# Patient Record
Sex: FEMALE | Race: WHITE | NOT HISPANIC OR LATINO | Employment: FULL TIME | URBAN - METROPOLITAN AREA
[De-identification: names, ages, dates, MRNs, and addresses within clinical notes are randomized per-mention and may not be internally consistent; named-entity substitution may affect disease eponyms.]

---

## 2017-10-11 ENCOUNTER — TRANSFERRED RECORDS (OUTPATIENT)
Dept: HEALTH INFORMATION MANAGEMENT | Facility: CLINIC | Age: 32
End: 2017-10-11

## 2017-10-11 LAB
HPV ABSTRACT: NORMAL
PAP-ABSTRACT: NORMAL

## 2019-01-04 ENCOUNTER — OFFICE VISIT (OUTPATIENT)
Dept: FAMILY MEDICINE | Facility: CLINIC | Age: 34
End: 2019-01-04
Payer: COMMERCIAL

## 2019-01-04 VITALS
OXYGEN SATURATION: 99 % | HEART RATE: 69 BPM | DIASTOLIC BLOOD PRESSURE: 80 MMHG | TEMPERATURE: 98.1 F | HEIGHT: 65 IN | RESPIRATION RATE: 16 BRPM | WEIGHT: 171 LBS | BODY MASS INDEX: 28.49 KG/M2 | SYSTOLIC BLOOD PRESSURE: 110 MMHG

## 2019-01-04 DIAGNOSIS — E66.3 OVERWEIGHT (BMI 25.0-29.9): ICD-10-CM

## 2019-01-04 DIAGNOSIS — Z00.00 ROUTINE GENERAL MEDICAL EXAMINATION AT A HEALTH CARE FACILITY: Primary | ICD-10-CM

## 2019-01-04 DIAGNOSIS — G43.809 OTHER MIGRAINE WITHOUT STATUS MIGRAINOSUS, NOT INTRACTABLE: ICD-10-CM

## 2019-01-04 PROCEDURE — 99395 PREV VISIT EST AGE 18-39: CPT | Performed by: FAMILY MEDICINE

## 2019-01-04 ASSESSMENT — MIFFLIN-ST. JEOR: SCORE: 1491.53

## 2019-01-04 NOTE — PROGRESS NOTES
SUBJECTIVE:   CC: Anne-Marie Hooks is an 33 year old woman who presents for preventive health visit.     Physical   Annual:     Getting at least 3 servings of Calcium per day:  Yes    Bi-annual eye exam:  Yes    Dental care twice a year:  Yes    Sleep apnea or symptoms of sleep apnea:  None    Diet:  Other    Frequency of exercise:  4-5 days/week    Duration of exercise:  45-60 minutes    Taking medications regularly:  Yes    Medication side effects:  Not applicable and None    Additional concerns today:  YES    PHQ-2 Total Score: 0    Derm Problem      Duration: x 2-3 months    Description (location/character/radiation): Mole on Back    Intensity:  mild    Accompanying signs and symptoms: None    History (similar episodes/previous evaluation): Family History of Melanoma    Precipitating or alleviating factors: None    Therapies tried and outcome: None     Today's PHQ-2 Score:   PHQ-2 ( 1999 [employer]) 12/31/2018   Q1: Little interest or pleasure in doing things 0   Q2: Feeling down, depressed or hopeless 0   PHQ-2 Score 0   Q1: Little interest or pleasure in doing things Not at all   Q2: Feeling down, depressed or hopeless Not at all   PHQ-2 Score 0       Abuse: Current or Past(Physical, Sexual or Emotional)- No  Do you feel safe in your environment? Yes    Social History     Tobacco Use     Smoking status: Never Smoker     Smokeless tobacco: Never Used   Substance Use Topics     Alcohol use: Yes     Alcohol/week: 0.0 oz     Comment: about 5 drinks per week     Alcohol Use 12/31/2018   If you drink alcohol do you typically have greater than 3 drinks per day OR greater than 7 drinks per week? No   No flowsheet data found.    Reviewed orders with patient.  Reviewed health maintenance and updated orders accordingly - Yes  Labs reviewed in EPIC    Mammogram not appropriate for this patient based on age.    Pertinent mammograms are reviewed under the imaging tab.  History of abnormal Pap smear: NO - age 30- 65 PAP every  "3 years recommended  PAP / HPV 12/4/2015   PAP NIL     Reviewed and updated as needed this visit by clinical staff  Tobacco  Allergies  Meds  Problems  Med Hx  Surg Hx  Fam Hx  Soc Hx          Reviewed and updated as needed this visit by Provider            Review of Systems  CONSTITUTIONAL: NEGATIVE for fever, chills, change in weight  INTEGUMENTARU/SKIN: NEGATIVE for worrisome rashes, moles or lesions  EYES: NEGATIVE for vision changes or irritation  ENT: NEGATIVE for ear, mouth and throat problems  RESP: NEGATIVE for significant cough or SOB  BREAST: NEGATIVE for masses, tenderness or discharge  CV: NEGATIVE for chest pain, palpitations or peripheral edema  GI: NEGATIVE for nausea, abdominal pain, heartburn, or change in bowel habits  : NEGATIVE for unusual urinary or vaginal symptoms. Periods are regular.  MUSCULOSKELETAL: NEGATIVE for significant arthralgias or myalgia  NEURO: NEGATIVE for weakness, dizziness or paresthesias  PSYCHIATRIC: NEGATIVE for changes in mood or affect     OBJECTIVE:   /80   Pulse 69   Temp 98.1  F (36.7  C) (Tympanic)   Resp 16   Ht 1.651 m (5' 5\")   Wt 77.6 kg (171 lb)   LMP  (LMP Unknown)   SpO2 99%   Breastfeeding? No   BMI 28.46 kg/m    Physical Exam  GENERAL: healthy, alert, no distress and over weight  EYES: Eyes grossly normal to inspection, PERRL and conjunctivae and sclerae normal  NECK: no adenopathy, no asymmetry, masses, or scars and thyroid normal to palpation  RESP: lungs clear to auscultation - no rales, rhonchi or wheezes  BREAST: normal without masses, tenderness or nipple discharge and no palpable axillary masses or adenopathy  CV: regular rate and rhythm, normal S1 S2, no S3 or S4, no murmur, click or rub, no peripheral edema and peripheral pulses strong  ABDOMEN: soft, nontender, no hepatosplenomegaly, no masses and bowel sounds normal  MS: no gross musculoskeletal defects noted, no edema  SKIN: no suspicious lesions or rashes  NEURO: Normal " "strength and tone, mentation intact and speech normal  PSYCH: mentation appears normal, affect normal/bright  LYMPH: no cervical, supraclavicular, axillary, or inguinal adenopathy    Diagnostic Test Results:  none     ASSESSMENT/PLAN:       ICD-10-CM    1. Routine general medical examination at a health care facility Z00.00    2. Other migraine without status migrainosus, not intractable G43.809    3. Overweight (BMI 25.0-29.9) E66.3        COUNSELING:  Reviewed preventive health counseling, as reflected in patient instructions       Regular exercise       Healthy diet/nutrition    BP Readings from Last 1 Encounters:   01/04/19 110/80     Estimated body mass index is 28.46 kg/m  as calculated from the following:    Height as of this encounter: 1.651 m (5' 5\").    Weight as of this encounter: 77.6 kg (171 lb).      Weight management plan: Discussed healthy diet and exercise guidelines     reports that  has never smoked. she has never used smokeless tobacco.     Patient Instructions     Preventive Health Recommendations  Female Ages 26 - 39  Yearly exam:   See your health care provider every year in order to    Review health changes.     Discuss preventive care.      Review your medicines if you your doctor has prescribed any.    Until age 30: Get a Pap test every three years (more often if you have had an abnormal result).    After age 30: Talk to your doctor about whether you should have a Pap test every 3 years or have a Pap test with HPV screening every 5 years.   You do not need a Pap test if your uterus was removed (hysterectomy) and you have not had cancer.  You should be tested each year for STDs (sexually transmitted diseases), if you're at risk.   Talk to your provider about how often to have your cholesterol checked.  If you are at risk for diabetes, you should have a diabetes test (fasting glucose).  Shots: Get a flu shot each year. Get a tetanus shot every 10 years.   Nutrition:     Eat at least 5 " servings of fruits and vegetables each day.    Eat whole-grain bread, whole-wheat pasta and brown rice instead of white grains and rice.    Get adequate Calcium and Vitamin D.     Lifestyle    Exercise at least 150 minutes a week (30 minutes a day, 5 days of the week). This will help you control your weight and prevent disease.    Limit alcohol to one drink per day.    No smoking.     Wear sunscreen to prevent skin cancer.    See your dentist every six months for an exam and cleaning.      1. Please do your breast exam every mo, when you  Change the  calendar page or set an alarm on your cell phone Do a  visual check for dimples, inversion or indentation or any different position of the nipple Feel manually  for any 1cm or larger  size mass ie about the size of an almond Be sure to cover the entire area of both breasts : this extends back to the back on either side and from the collar bone to the bottom of the breasts where you can begin to feel ribs.  Men are advised to do this exam also as they now have a higher rate of breast cancer , like women do .     2.  Weight Loss Tips  1. Do not eat after 6 hrs before your expected bedtime  2. Have your heaviest meal for breakfast, a slightly lighter meal at lunch and a snack 6 hrs before bed  3. No sugar/calorie drinks except milk ie no fruit juice, pop, alcohol.  4. Drink milk 30min before meals to decrease your hunger. Also it is excellent as part of your last meal of the day snack  5. Drink lots of water  6. Increase fiber in diet: all bran cereal, salads, popcorn etc  7. Have only one small serving of fruit a day about 1/2 cup (as this is high in sugar)  8. EXERCISE is the bottom line. Without it, you will gain weight even on a low calorie diet. Best if done 2-3X a day as can    Being overweight contributes to high blood pressure and high cholesterol, both of which cause heart attacks, strokes and kidney failure, prediabetes and diabetes, arthritis, and liver disease              Counseling Resources:  ATP IV Guidelines  Pooled Cohorts Equation Calculator  Breast Cancer Risk Calculator  FRAX Risk Assessment  ICSI Preventive Guidelines  Dietary Guidelines for Americans, 2010  USDA's MyPlate  ASA Prophylaxis  Lung CA Screening    Savi Avelar MD  [facility]

## 2019-01-04 NOTE — NURSING NOTE
"Chief Complaint   Patient presents with     Physical     /80   Pulse 69   Temp 98.1  F (36.7  C) (Tympanic)   Resp 16   Ht 1.651 m (5' 5\")   Wt 77.6 kg (171 lb)   LMP  (LMP Unknown)   SpO2 99%   Breastfeeding? No   BMI 28.46 kg/m   Estimated body mass index is 28.46 kg/m  as calculated from the following:    Height as of this encounter: 1.651 m (5' 5\").    Weight as of this encounter: 77.6 kg (171 lb).  BP completed using cuff size: regular   Honey Quiroga CMA    Health Maintenance Due   Topic Date Due     HIV SCREEN (SYSTEM ASSIGNED)  07/28/2005     INFLUENZA VACCINE (1) 09/01/2018     Health Maintenance reviewed at today's visit patient asked to schedule/complete:   Immunizations:  Patient agrees to schedule    "

## 2019-01-04 NOTE — PATIENT INSTRUCTIONS
Preventive Health Recommendations  Female Ages 26 - 39  Yearly exam:   See your health care provider every year in order to    Review health changes.     Discuss preventive care.      Review your medicines if you your doctor has prescribed any.    Until age 30: Get a Pap test every three years (more often if you have had an abnormal result).    After age 30: Talk to your doctor about whether you should have a Pap test every 3 years or have a Pap test with HPV screening every 5 years.   You do not need a Pap test if your uterus was removed (hysterectomy) and you have not had cancer.  You should be tested each year for STDs (sexually transmitted diseases), if you're at risk.   Talk to your provider about how often to have your cholesterol checked.  If you are at risk for diabetes, you should have a diabetes test (fasting glucose).  Shots: Get a flu shot each year. Get a tetanus shot every 10 years.   Nutrition:     Eat at least 5 servings of fruits and vegetables each day.    Eat whole-grain bread, whole-wheat pasta and brown rice instead of white grains and rice.    Get adequate Calcium and Vitamin D.     Lifestyle    Exercise at least 150 minutes a week (30 minutes a day, 5 days of the week). This will help you control your weight and prevent disease.    Limit alcohol to one drink per day.    No smoking.     Wear sunscreen to prevent skin cancer.    See your dentist every six months for an exam and cleaning.      1. Please do your breast exam every mo, when you  Change the  calendar page or set an alarm on your cell phone Do a  visual check for dimples, inversion or indentation or any different position of the nipple Feel manually  for any 1cm or larger  size mass ie about the size of an almond Be sure to cover the entire area of both breasts : this extends back to the back on either side and from the collar bone to the bottom of the breasts where you can begin to feel ribs.  Men are advised to do this exam also  as they now have a higher rate of breast cancer , like women do .     2.  Weight Loss Tips  1. Do not eat after 6 hrs before your expected bedtime  2. Have your heaviest meal for breakfast, a slightly lighter meal at lunch and a snack 6 hrs before bed  3. No sugar/calorie drinks except milk ie no fruit juice, pop, alcohol.  4. Drink milk 30min before meals to decrease your hunger. Also it is excellent as part of your last meal of the day snack  5. Drink lots of water  6. Increase fiber in diet: all bran cereal, salads, popcorn etc  7. Have only one small serving of fruit a day about 1/2 cup (as this is high in sugar)  8. EXERCISE is the bottom line. Without it, you will gain weight even on a low calorie diet. Best if done 2-3X a day as can    Being overweight contributes to high blood pressure and high cholesterol, both of which cause heart attacks, strokes and kidney failure, prediabetes and diabetes, arthritis, and liver disease

## 2019-01-04 NOTE — LETTER
My Migraine Action Plan      Date: 1/4/2019     My Name: Anne-Marie Hooks   YOB: 1985  [facility]  [place] - [address]       My (Preventative) Control Medicine: 0        My Rescue Medicine: imitrex   My Doctor: Mihaela Hanna     My Clinic: [facility]  [address] [place] [id number]  [phone]        GREEN ZONE = Good Control    My headache plan is working.   I can do what I need to do.           I WILL:     ? Keep managing my triggers.  ? Write in my migraine diary each time I have a headache.  ? Keep taking my preventive (controller) medicine daily.  ? Take my relief and rescue medicine as needed.             YELLOW ZONE = Not Enough Control    My headache plan isn t always working.   My headaches keep me from doing   some of the things I need to do.       I WILL:     ? Set goals to control my triggers and act on them.  ? Write in my migraine diary each time I have a headache and review it for                      patterns or new triggers.  ? Keep taking my preventive (controller) medicine daily.  ? Take my relief and rescue medicine as needed.  ? Call my doctor or clinic at if I stay in the Yellow Zone.             RED ZONE = Poor or No Control    My headache plan has  failed. I can t do anything  when I have one. My  medicines aren t working.           I WILL:   ? Set goals to control my triggers and act on them.  ? Write in my migraine diary each time I have a headache and review it for                      patterns or new triggers.  ? Keep taking my preventive (controller) medicine daily.  ? Take my relief and rescue medicine as needed.  ? Call my doctor or clinic or go to urgent care or an ER if I m having the worst                  headache of my life.  ? Call my doctor or clinic or go to urgent care or an ER if my medicine doesn t work.  ? Let my doctor or clinic  know within 2 weeks if I have gone to an urgent care or             emergency department.          Provider specific instructions:  --

## 2019-06-29 ENCOUNTER — OFFICE VISIT (OUTPATIENT)
Dept: FAMILY MEDICINE | Facility: CLINIC | Age: 34
End: 2019-06-29
Payer: COMMERCIAL

## 2019-06-29 ENCOUNTER — ANCILLARY PROCEDURE (OUTPATIENT)
Dept: GENERAL RADIOLOGY | Facility: CLINIC | Age: 34
End: 2019-06-29
Attending: PHYSICIAN ASSISTANT
Payer: COMMERCIAL

## 2019-06-29 ENCOUNTER — MYC MEDICAL ADVICE (OUTPATIENT)
Dept: FAMILY MEDICINE | Facility: CLINIC | Age: 34
End: 2019-06-29

## 2019-06-29 VITALS
BODY MASS INDEX: 26.96 KG/M2 | RESPIRATION RATE: 16 BRPM | OXYGEN SATURATION: 100 % | WEIGHT: 162 LBS | DIASTOLIC BLOOD PRESSURE: 70 MMHG | SYSTOLIC BLOOD PRESSURE: 90 MMHG | HEART RATE: 89 BPM

## 2019-06-29 DIAGNOSIS — L03.011 CELLULITIS OF FINGER OF RIGHT HAND: ICD-10-CM

## 2019-06-29 DIAGNOSIS — H92.02 OTALGIA, LEFT: ICD-10-CM

## 2019-06-29 DIAGNOSIS — M79.671 RIGHT FOOT PAIN: Primary | ICD-10-CM

## 2019-06-29 DIAGNOSIS — L03.011 PARONYCHIA OF FINGER OF RIGHT HAND: ICD-10-CM

## 2019-06-29 PROCEDURE — 73630 X-RAY EXAM OF FOOT: CPT | Mod: RT

## 2019-06-29 PROCEDURE — 99214 OFFICE O/P EST MOD 30 MIN: CPT | Performed by: PHYSICIAN ASSISTANT

## 2019-06-29 RX ORDER — SULFAMETHOXAZOLE/TRIMETHOPRIM 800-160 MG
1 TABLET ORAL 2 TIMES DAILY
Qty: 14 TABLET | Refills: 0 | Status: SHIPPED | OUTPATIENT
Start: 2019-06-29 | End: 2019-07-05

## 2019-06-29 NOTE — PROGRESS NOTES
Subjective     Anne-Marie Hooks is a 33 year old female who presents to clinic today for the following health issues:    HPI   Musculoskeletal problem/pain      Duration: about a week    Description  Location: right foot    Intensity:  moderate    Accompanying signs and symptoms: none    History  Previous similar problem: no   Previous evaluation:  none    Precipitating or alleviating factors:  Trauma or overuse: YES- pt ran a marathon  Aggravating factors include: walking    Therapies tried and outcome: ice and Ibuprofen    Finger issue      Duration: about 4 weeks    Description (location/character/radiation): pt right pinky finger has been red for a while.  No pain or drainage.      Intensity:  moderate    Accompanying signs and symptoms: none    History (similar episodes/previous evaluation): None    Precipitating or alleviating factors: None    Therapies tried and outcome: None     BP Readings from Last 3 Encounters:   06/29/19 90/70   01/04/19 110/80   11/07/16 102/70    Wt Readings from Last 3 Encounters:   06/29/19 73.5 kg (162 lb)   01/04/19 77.6 kg (171 lb)   11/07/16 90.7 kg (200 lb)         Reviewed and updated as needed this visit by Provider  Tobacco  Allergies  Meds  Problems  Med Hx  Surg Hx  Fam Hx  Soc Hx        Additional complaints: Pt is also having some left ear pain she thinks may be from her new earphones    HPI additional notes: Anne-Marie presents today with   Chief Complaint   Patient presents with     Musculoskeletal Problem              Review of Systems   C: Negative for fever, chills, recent change in weight  Skin: POSITIVE for warmth, swelling and redness of the finger. Negative for discharge  ENT/MOUTH:POSITIVE for ear pain.  Negative for congestion and runny nose.  Resp: Negative for significant cough or SOB  CV: Negative for chest pain or peripheral edema  GI: Negative for nausea, abdominal pain, heartburn, or change in bowel habits  MS: Positive for right foot pain  P: Negative  for changes in mood or affect  ROS all other systems negative.        Objective    BP 90/70   Pulse 89   Resp 16   Wt 73.5 kg (162 lb)   SpO2 100%   BMI 26.96 kg/m    Body mass index is 26.96 kg/m .  Physical Exam   GENERAL: healthy, alert, in no acute distress  EYES: Grossly normal to inspection, EOMI, PERRL  HENT: Ear canals normal; TMs pearly gray without effusion. Nasal mucosa moist without edema or discharge. Oral mucous membranes moist, no lesions or ulcerations. Pharynx pink.  Uvula midline.  No postnasal drainage. Sinuses non-tender to palpation.  NECK: Non-tender, no adenopathy.  ORTHO:  FOOT  foot exam : Inspection normal, tender to palpation of 5th proximal metatarsal  Swelling: no swelling  Tender: at proximal 5th metatarsal  Non-tender:calcaneous , plantar fascia  Range of Motion:flexion of toes:  full, painful, extension of toes  full  SKIN:   Location: 5th right finger medial and proximal nail border     Distribution: localized     Lesion type: patches     Color: red  PSYCH: Alert and oriented times 3;  Able to articulate logical thoughts. Affect is normal.    Diagnostic test results: Xray - wnl        Assessment & Plan       ICD-10-CM    1. Right foot pain M79.671 XR Foot Right G/E 3 Views   2. Cellulitis of finger of right hand L03.011 sulfamethoxazole-trimethoprim (BACTRIM DS) 800-160 MG tablet   3. Paronychia of finger of right hand L03.011 sulfamethoxazole-trimethoprim (BACTRIM DS) 800-160 MG tablet   4. Otalgia, left H92.02    Will treat for paronychia due to prolonged erythema on the right 5th finger.  Pt will continue soaking the finger.  If not improving, will let me know.  Will start on bactrim due to cephalosporin allergy.    Discussed normal exam of left ear, likely pain due to new headphones.    Xray today for right foot with lateral pain after running a marathon last week.  She is not significantly tender on exam but due to the recent overuse imaging was done due to concern for  possible fifth metatarsal fracture. Discussed result with pt. Will call pt if radiologist reading differs.  Recommend RICE and ace wrap.  Can return in one week for imaging only if pain is worsening.      Please see patient instructions for treatment details.    Return in about 1 week (around 7/6/2019) for phone call to clinic.    Diana Pena PA-C  [facility]

## 2019-06-29 NOTE — PATIENT INSTRUCTIONS
Patient Education     Self-Care for Strains and Sprains  Most minor strains and sprains can be treated with self-care. Recovering from a strain or sprain may take 6 to 8 weeks. Your self-care goal is to reduce pain and immobilize the injury to speed healing.     A sprain injures ligaments (tissue that connects bones to bones).      A strain injures muscles or tendons (tissue that connects muscles to bones).   Support the injured area  Wrapping the injured area provides support for short, necessary activities. Be careful not to wrap the area too tightly. This could cut off the blood supply.    Support a wrist, elbow, or shoulder with a sling.    Wrap an ankle or knee with an elastic bandage.    Tape a finger or toe to the one next to it.  Use cold and heat  Cold reduces swelling. Both cold and heat reduce pain. Heat should not be used in the initial treatment of the injury. When using cold or heat, always place a thin towel between the pack and your skin.    Apply ice or a cold pack 10 to 15 minutes every hour you re awake for the first 2 days.    After the swelling goes down, use cold or heat to control pain. Don t use heat late in the day, since it can cause swelling when you re not active.  Rest and elevate  Rest and elevation help your injury heal faster.    Raise the injured area above your heart level.    Keep the injured area from moving.    Limit the use of the joint or limb.  Use medicine    Aspirin reduces pain and swelling. (Note: Don t give aspirin to a child 18 or younger unless prescribed by the doctor.)    Non-steroidal anti-inflammatory medicines, such as ibuprofen, may reduce pain and swelling, as well. Ask your healthcare provider for advice.  When to call your healthcare provider  Call your healthcare provider if:    The injured joint won t move, or bones make a grating sound when they move    You can t put weight on the injured area, even after 24 hours    The injured body part is cold, blue,  tingling, or numb    The joint or limb appears bent or crooked.    Pain increases or doesn t improve in 4 days    When pressing along the injured area, you notice a spot that is especially painful  Date Last Reviewed: 5/1/2018 2000-2018 [employer]. [address] [place] [address]. All rights reserved. This information is not intended as a substitute for professional medical care. Always follow your healthcare professional's instructions.

## 2019-06-29 NOTE — RESULT ENCOUNTER NOTE
Prasad Xie,    I just wanted to let you know that your lab results have been reviewed and are attached.    - Your foot xray was normal    Please let me know if you have any questions and have a great week!    Sincerely,    Breana Pena PA-C    [facility]  [address]  [place] [address]  [phone] (p)

## 2019-07-03 ENCOUNTER — MYC MEDICAL ADVICE (OUTPATIENT)
Dept: FAMILY MEDICINE | Facility: CLINIC | Age: 34
End: 2019-07-03

## 2019-07-05 NOTE — TELEPHONE ENCOUNTER
Pt stopped bactrim per pharmacist advice. She had a rash on both arms with this medication. Allergy and medication list was updated.     Pt reports her finger has not improved or worsen since OV 06/29/2019. Asked if she should be taking a different abx or schedule OV again?Advised OV. Pt has scheduled future appt. She would cancel appointment if this is not needed.  Please advice if different abx can be sent without an OV.  No need to call if she is to keep appointment.

## 2019-07-05 NOTE — TELEPHONE ENCOUNTER
I agree with OV - I typically would drain this in the office.     I can see her tomorrow if she is in town, otherwise go to urgent care.         REBECA PattersonC

## 2019-07-08 ENCOUNTER — OFFICE VISIT (OUTPATIENT)
Dept: FAMILY MEDICINE | Facility: CLINIC | Age: 34
End: 2019-07-08
Payer: COMMERCIAL

## 2019-07-08 VITALS
SYSTOLIC BLOOD PRESSURE: 110 MMHG | RESPIRATION RATE: 14 BRPM | TEMPERATURE: 97.7 F | BODY MASS INDEX: 27.29 KG/M2 | DIASTOLIC BLOOD PRESSURE: 68 MMHG | OXYGEN SATURATION: 100 % | HEART RATE: 64 BPM | WEIGHT: 164 LBS

## 2019-07-08 DIAGNOSIS — Z88.1 ALLERGY TO ANTIBIOTIC: ICD-10-CM

## 2019-07-08 DIAGNOSIS — L03.011 PARONYCHIA OF FINGER OF RIGHT HAND: Primary | ICD-10-CM

## 2019-07-08 PROCEDURE — 99213 OFFICE O/P EST LOW 20 MIN: CPT | Performed by: PHYSICIAN ASSISTANT

## 2019-07-08 RX ORDER — MUPIROCIN 20 MG/G
OINTMENT TOPICAL 3 TIMES DAILY
Qty: 22 G | Refills: 1 | Status: CANCELLED | OUTPATIENT
Start: 2019-07-08 | End: 2019-07-13

## 2019-07-08 RX ORDER — BETAMETHASONE DIPROPIONATE 0.5 MG/G
CREAM TOPICAL
Qty: 15 G | Refills: 1 | Status: SHIPPED | OUTPATIENT
Start: 2019-07-08 | End: 2019-12-13

## 2019-07-08 NOTE — PROGRESS NOTES
Subjective     Anne-Marie Hooks is a 33 year old female who presents to clinic today for the following health issues:    HPI   Cellulitis      Duration: ongoing since june    Description (location/character/radiation): patient is here today to follow up on cellulitis on her right hand, pinky finger. No pain, no itchiness, redness, no drainage    Intensity:  mild, moderate    Accompanying signs and symptoms: see above    History (similar episodes/previous evaluation): None    Precipitating or alleviating factors: None    Therapies tried and outcome: bactrim       Reviewed and updated as needed this visit by Provider  Tobacco  Allergies  Meds  Problems  Med Hx  Surg Hx  Fam Hx  Soc Hx          Additional complaints: Allergy referral to check on antibiotic allergies to see if she is truly allergic.    HPI additional notes: Anne-Marie presents today with   Chief Complaint   Patient presents with     Cellulitis   Was able to take 4.5 days of bactrim before starting having red bumps on arms, legs and chest.  Noted no improvement in her rash.       Review of Systems   C: Negative for fever, chills, recent change in weight  Skin: POSITIVE for warmth, swelling and redness of the right finger. Negative for discharge  ENT: Negative for ear, mouth and throat problems  MS: Negative for significant arthralgias or myalgias  P: Negative for changes in mood or affect      Objective    /68   Pulse 64   Temp 97.7  F (36.5  C) (Tympanic)   Resp 14   Wt 74.4 kg (164 lb)   SpO2 100%   BMI 27.29 kg/m    Body mass index is 27.29 kg/m .  Physical Exam   GENERAL: healthy, alert, in no acute distress  EYES: Grossly normal to inspection, EOMI, PERRL  SKIN:   Location: 5th right finger medial and proximal nail border     Distribution: localized     Lesion type: patches     Color: red  PSYCH: Alert and oriented times 3;  Able to articulate logical thoughts. Affect is normal.    Diagnostic Test Results:  none         Assessment &  Plan         ICD-10-CM    1. Paronychia of finger of right hand L03.011 betamethasone dipropionate (DIPROSONE) 0.05 % external cream   2. Allergy to antibiotic Z88.1 ALLERGY/ASTHMA ADULT REFERRAL     Will try treating for chronic paronychia.  Discussed may take 2-4 weeks before improvement is noted.  If not resolving, pt can send my chart message and I will refer her to dermatology.    Please see patient instructions for treatment details.    Return in about 2 weeks (around 7/22/2019) for Recheck if not improving.    Diana Pena PA-C  [facility]

## 2019-08-02 ENCOUNTER — MYC MEDICAL ADVICE (OUTPATIENT)
Dept: FAMILY MEDICINE | Facility: CLINIC | Age: 34
End: 2019-08-02

## 2019-08-02 DIAGNOSIS — L30.9 DERMATITIS: Primary | ICD-10-CM

## 2019-08-02 NOTE — TELEPHONE ENCOUNTER
See mychart message and advise. Per last OV note, referral to dermatology if not improving. Pended order. Routing to provider to advise.

## 2019-08-05 ENCOUNTER — MYC MEDICAL ADVICE (OUTPATIENT)
Dept: FAMILY MEDICINE | Facility: CLINIC | Age: 34
End: 2019-08-05

## 2019-09-17 ENCOUNTER — OFFICE VISIT (OUTPATIENT)
Dept: DERMATOLOGY | Facility: CLINIC | Age: 34
End: 2019-09-17
Payer: COMMERCIAL

## 2019-09-17 VITALS — SYSTOLIC BLOOD PRESSURE: 124 MMHG | DIASTOLIC BLOOD PRESSURE: 83 MMHG | OXYGEN SATURATION: 100 % | HEART RATE: 73 BPM

## 2019-09-17 DIAGNOSIS — L81.0 POST-INFLAMMATORY HYPERPIGMENTATION: Primary | ICD-10-CM

## 2019-09-17 DIAGNOSIS — L73.8 SEBACEOUS GLAND HYPERPLASIA: ICD-10-CM

## 2019-09-17 PROCEDURE — 99213 OFFICE O/P EST LOW 20 MIN: CPT | Performed by: PHYSICIAN ASSISTANT

## 2019-09-17 NOTE — LETTER
9/17/2019         RE: Anne-Marie Hooks  [address]  [facility] [address]        Dear Colleague,    Thank you for referring your patient, Anne-Marie Hooks, to the [facility]. Please see a copy of my visit note below.    HPI:   Chief complaints: Anne-Marie Hooks is a 34 year old female who presents for evaluation of red finger on her right hand. Had inflammation and swelling on the right 5th digit. Was treated for a presumed infection and given Bactrim which gave her a rash. Then was treated with topical betamethasone which did not resolve redness. She does not have pain currently.   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 It was created on her behalf by Leila Mariee, a trained medical scribe. The creation of this document is based on the provider's statements to the medical scribe.  Leila Mariee 3:53 PM September 17, 2019    PHYSICAL EXAM:    /83   Pulse 73   SpO2 100%   Breastfeeding? No   Skin exam performed as follows: Type 2 skin. Mood appropriate  Alert and Oriented X 3. Well developed, well nourished in no distress.  General appearance: Normal  Head including face: Normal  Eyes: conjunctiva and lids: Normal  Mouth: Lips, teeth, gums: Normal  Neck: Normal  Chest-breast/axillae: Normal  Back: Normal  Spleen  and liver: Normal  Cardiovascular: Exam of peripheral vascular system by observation for swelling, varicosities, edema: Normal  Genitalia: groin, buttocks: Normal  Extremities: digits/nails (clubbing): Normal  Eccrine and Apocrine glands: Normal  Right upper extremity: Normal  Left upper extremity: Normal  Right lower extremity: Normal  Left lower extremity: Normal  Skin: Scalp and body hair: See below    1. Flat pink discoloration around the right 5th nailbed   2. 3 mm tan papule on the left alar rim    ASSESSMENT/PLAN:     1. Favor post inflammatory hyperpigmentation; no lesion present today. Advised will gradually fade over the next 6-12 months. Return for any new lesions.   2. Fibrous papule located on rim of nose. Advised benign. Discussed shave removal if bothersome.           Follow-up: PRN   CC:   Scribed By: Leila Mariee, Medical Scribe    The information in this document, created by the medical scribe for me, accurately reflects the services I personally performed and the decisions made by me. I have reviewed and approved this document for accuracy prior to leaving the patient care area.  September 17, 2019 3:58 PM    Maddi Mccartney MS, ADRIANO        Again, thank you for allowing me to participate in the care of your patient.        Sincerely,        Maddi Mccartney PA-C

## 2019-09-17 NOTE — PROGRESS NOTES
HPI:   Chief complaints: Anne-Marie Hooks is a 34 year old female who presents for evaluation of red finger on her right hand. Had inflammation and swelling on the right 5th digit. Was treated for a presumed infection and given Bactrim which gave her a rash. Then was treated with topical betamethasone which did not resolve redness. She does not have pain currently.   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 It was created on her behalf by Leila Mariee, a trained medical scribe. The creation of this document is based on the provider's statements to the medical scribe.  Leila Mariee 3:53 PM September 17, 2019    PHYSICAL EXAM:    /83   Pulse 73   SpO2 100%   Breastfeeding? No   Skin exam performed as follows: Type 2 skin. Mood appropriate  Alert and Oriented X 3. Well developed, well nourished in no distress.  General appearance: Normal  Head including face: Normal  Eyes: conjunctiva and lids: Normal  Mouth: Lips, teeth, gums: Normal  Neck: Normal  Chest-breast/axillae: Normal  Back: Normal  Spleen and liver: Normal  Cardiovascular: Exam of peripheral vascular system by observation for swelling, varicosities, edema: Normal  Genitalia: groin, buttocks: Normal  Extremities: digits/nails (clubbing): Normal  Eccrine and Apocrine glands: Normal  Right  upper extremity: Normal  Left upper extremity: Normal  Right lower extremity: Normal  Left lower extremity: Normal  Skin: Scalp and body hair: See below    1. Flat pink discoloration around the right 5th nailbed   2. 3 mm tan papule on the left alar rim    ASSESSMENT/PLAN:     1. Favor post inflammatory hyperpigmentation; no lesion present today. Advised will gradually fade over the next 6-12 months. Return for any new lesions.   2. Fibrous papule located on rim of nose. Advised benign. Discussed shave removal if bothersome.           Follow-up: PRN   CC:   Scribed By: Leila Mariee, Medical Scribe    The information in this document, created by the medical scribe for me, accurately reflects the services I personally performed and the decisions made by me. I have reviewed and approved this document for accuracy prior to leaving the patient care area.  September 17, 2019 3:58 PM    Maddi Mccartney MS, PA-C

## 2019-11-04 ENCOUNTER — HEALTH MAINTENANCE LETTER (OUTPATIENT)
Age: 34
End: 2019-11-04

## 2019-12-06 ENCOUNTER — MYC MEDICAL ADVICE (OUTPATIENT)
Dept: FAMILY MEDICINE | Facility: CLINIC | Age: 34
End: 2019-12-06

## 2019-12-13 ENCOUNTER — TELEPHONE (OUTPATIENT)
Dept: FAMILY MEDICINE | Facility: CLINIC | Age: 34
End: 2019-12-13

## 2019-12-13 DIAGNOSIS — J02.0 STREP THROAT: Primary | ICD-10-CM

## 2019-12-13 RX ORDER — AZITHROMYCIN 250 MG/1
TABLET, FILM COATED ORAL
Qty: 6 TABLET | Refills: 0 | Status: SHIPPED | OUTPATIENT
Start: 2019-12-13 | End: 2020-01-17

## 2019-12-13 NOTE — TELEPHONE ENCOUNTER
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 They did a rapid strep test and it was positive. She states they cannot write rx for abx, but did give her print out of results. Informed patient she may need to start eVisit. Routing to provider to advise.

## 2019-12-13 NOTE — TELEPHONE ENCOUNTER
Patient Contact    Called patient and notified that rx was sent to pharmacy. No further action needed.

## 2019-12-20 ENCOUNTER — MYC MEDICAL ADVICE (OUTPATIENT)
Dept: FAMILY MEDICINE | Facility: CLINIC | Age: 34
End: 2019-12-20

## 2019-12-23 ENCOUNTER — E-VISIT (OUTPATIENT)
Dept: FAMILY MEDICINE | Facility: CLINIC | Age: 34
End: 2019-12-23
Payer: COMMERCIAL

## 2019-12-23 DIAGNOSIS — J02.0 STREP THROAT: Primary | ICD-10-CM

## 2019-12-23 PROCEDURE — 99444 ZZC PHYSICIAN ONLINE EVALUATION & MANAGEMENT SERVICE: CPT | Performed by: PHYSICIAN ASSISTANT

## 2019-12-23 RX ORDER — LEVOFLOXACIN 500 MG/1
500 TABLET, FILM COATED ORAL DAILY
Qty: 7 TABLET | Refills: 0 | Status: SHIPPED | OUTPATIENT
Start: 2019-12-23 | End: 2020-01-17

## 2019-12-23 NOTE — TELEPHONE ENCOUNTER
She can do an evisit and we can have her come in for a strep test.  Lab can just have an MA do it when she comes in.

## 2020-01-20 NOTE — PROGRESS NOTES
SUBJECTIVE:   CC: Anne-Marie Hooks is an 34 year old woman who presents for preventive health visit.     HPI    {Outside tests to abstract? :267144}    {additional problems to add (Optional):243365}    Today's PHQ-2 Score:   PHQ-2 ( 1999 Pfizer) 12/31/2018   Q1: Little interest or pleasure in doing things 0   Q2: Feeling down, depressed or hopeless 0   PHQ-2 Score 0   Q1: Little interest or pleasure in doing things Not at all   Q2: Feeling down, depressed or hopeless Not at all   PHQ-2 Score 0       Abuse: Current or Past(Physical, Sexual or Emotional)- { :344693}  Do you feel safe in your environment? { :234246}        Social History     Tobacco Use     Smoking status: Never Smoker     Smokeless tobacco: Never Used   Substance Use Topics     Alcohol use: Yes     Alcohol/week: 0.0 standard drinks     Comment: about 5 drinks per week     {Rooming Staff- Complete this question if Prescreen response is not shown below for today's visit. If you drink alcohol do you typically have >3 drinks per day or >7 drinks per week? (Optional):398466}    Alcohol Use 1/4/2019   Prescreen: >3 drinks/day or >7 drinks/week? -   Prescreen: >3 drinks/day or >7 drinks/week? { AUDIT responses all:TXT,41661}   {add AUDIT responses (Optional) (A score of 7 for adult men is an indication of hazardous drinking; a score of 8 or more is an indication of an alcohol use disorder.  A score of 7 or more for adult women is an indication of hazardous drinking or an alchohol use disorder):185728}    Reviewed orders with patient.  Reviewed health maintenance and updated orders accordingly - Yes  BP Readings from Last 3 Encounters:   09/17/19 124/83   07/08/19 110/68   06/29/19 90/70    Wt Readings from Last 3 Encounters:   07/08/19 74.4 kg (164 lb)   06/29/19 73.5 kg (162 lb)   01/04/19 77.6 kg (171 lb)                  No lab results found.     Mammogram not appropriate for this patient based on age.    Pertinent mammograms are reviewed under the  "imaging tab.  History of abnormal Pap smear: NO - age 30-65 PAP every 5 years with negative HPV co-testing recommended  PAP / HPV 12/4/2015   PAP NIL     Reviewed and updated as needed this visit by clinical staff         Reviewed and updated as needed this visit by Provider          Review of Systems  {normal premenopausal female:584193}     OBJECTIVE:   There were no vitals taken for this visit.  Physical Exam  {FEMALE - complete :809683}    Diagnostic Test Results:  Labs reviewed in Epic    ASSESSMENT/PLAN:       ICD-10-CM    1. Routine history and physical examination of adult Z00.00 Lipid panel reflex to direct LDL Fasting     Glucose   2. Pap smear for cervical cancer screening Z12.4 Pap imaged thin layer screen with HPV - recommended age 30 - 65 years (select HPV order below)     HPV High Risk Types DNA Cervical   3. Screening examination for venereal disease Z11.3 Chlamydia trachomatis PCR     Neisseria gonorrhoeae PCR     HIV Antigen Antibody Combo     Treponema Abs w Reflex to RPR and Titer       COUNSELING:  Reviewed preventive health counseling, as reflected in patient instructions    Estimated body mass index is 27.29 kg/m  as calculated from the following:    Height as of 1/4/19: 1.651 m (5' 5\").    Weight as of 7/8/19: 74.4 kg (164 lb).    Weight management plan: Discussed healthy diet and exercise guidelines     reports that she has never smoked. She has never used smokeless tobacco.      Counseling Resources:  ATP IV Guidelines  Pooled Cohorts Equation Calculator  Breast Cancer Risk Calculator  FRAX Risk Assessment  ICSI Preventive Guidelines  Dietary Guidelines for Americans, 2010  USDA's MyPlate  ASA Prophylaxis  Lung CA Screening    Diana Pena PA-C  [facility]  "

## 2020-01-24 ENCOUNTER — OFFICE VISIT (OUTPATIENT)
Dept: FAMILY MEDICINE | Facility: CLINIC | Age: 35
End: 2020-01-24
Payer: COMMERCIAL

## 2020-01-24 VITALS
HEIGHT: 66 IN | WEIGHT: 164 LBS | SYSTOLIC BLOOD PRESSURE: 104 MMHG | BODY MASS INDEX: 26.36 KG/M2 | HEART RATE: 59 BPM | OXYGEN SATURATION: 100 % | RESPIRATION RATE: 14 BRPM | DIASTOLIC BLOOD PRESSURE: 74 MMHG | TEMPERATURE: 98.1 F

## 2020-01-24 DIAGNOSIS — Z00.00 ROUTINE HISTORY AND PHYSICAL EXAMINATION OF ADULT: Primary | ICD-10-CM

## 2020-01-24 DIAGNOSIS — Z11.3 SCREENING EXAMINATION FOR VENEREAL DISEASE: ICD-10-CM

## 2020-01-24 DIAGNOSIS — Z12.4 PAP SMEAR FOR CERVICAL CANCER SCREENING: ICD-10-CM

## 2020-01-24 DIAGNOSIS — Z97.5 IUD (INTRAUTERINE DEVICE) IN PLACE: ICD-10-CM

## 2020-01-24 LAB
CHOLEST SERPL-MCNC: 206 MG/DL
GLUCOSE SERPL-MCNC: 82 MG/DL (ref 70–99)
HDLC SERPL-MCNC: 76 MG/DL
LDLC SERPL CALC-MCNC: 123 MG/DL
NONHDLC SERPL-MCNC: 130 MG/DL
TRIGL SERPL-MCNC: 37 MG/DL

## 2020-01-24 PROCEDURE — 82947 ASSAY GLUCOSE BLOOD QUANT: CPT | Performed by: PHYSICIAN ASSISTANT

## 2020-01-24 PROCEDURE — 87591 N.GONORRHOEAE DNA AMP PROB: CPT | Performed by: PHYSICIAN ASSISTANT

## 2020-01-24 PROCEDURE — 36415 COLL VENOUS BLD VENIPUNCTURE: CPT | Performed by: PHYSICIAN ASSISTANT

## 2020-01-24 PROCEDURE — 86780 TREPONEMA PALLIDUM: CPT | Performed by: PHYSICIAN ASSISTANT

## 2020-01-24 PROCEDURE — 87624 HPV HI-RISK TYP POOLED RSLT: CPT | Performed by: PHYSICIAN ASSISTANT

## 2020-01-24 PROCEDURE — 99395 PREV VISIT EST AGE 18-39: CPT | Performed by: PHYSICIAN ASSISTANT

## 2020-01-24 PROCEDURE — 87491 CHLMYD TRACH DNA AMP PROBE: CPT | Performed by: PHYSICIAN ASSISTANT

## 2020-01-24 PROCEDURE — G0145 SCR C/V CYTO,THINLAYER,RESCR: HCPCS | Performed by: PHYSICIAN ASSISTANT

## 2020-01-24 PROCEDURE — 80061 LIPID PANEL: CPT | Performed by: PHYSICIAN ASSISTANT

## 2020-01-24 PROCEDURE — 87389 HIV-1 AG W/HIV-1&-2 AB AG IA: CPT | Performed by: PHYSICIAN ASSISTANT

## 2020-01-24 ASSESSMENT — MIFFLIN-ST. JEOR: SCORE: 1462.71

## 2020-01-24 NOTE — PROGRESS NOTES
SUBJECTIVE:   CC: Anne-Marie Hooks is an 34 year old woman who presents for preventive health visit.     Healthy Habits:    Do you get at least three servings of calcium containing foods daily (dairy, green leafy vegetables, etc.)? yes    Amount of exercise or daily activities, outside of work: 4 day(s) per week    Problems taking medications regularly No    Medication side effects: No    Have you had an eye exam in the past two years? yes    Do you see a dentist twice per year? yes    Do you have sleep apnea, excessive snoring or daytime drowsiness?no      STD screening, no known exposures or symptoms.    Today's PHQ-2 Score:   PHQ-2 ( 1999 Pfizer) 1/24/2020 12/31/2018   Q1: Little interest or pleasure in doing things 0 0   Q2: Feeling down, depressed or hopeless 0 0   PHQ-2 Score 0 0   Q1: Little interest or pleasure in doing things - Not at all   Q2: Feeling down, depressed or hopeless - Not at all   PHQ-2 Score - 0       Abuse: Current or Past(Physical, Sexual or Emotional)- No  Do you feel safe in your environment? Yes        Social History     Tobacco Use     Smoking status: Never Smoker     Smokeless tobacco: Never Used   Substance Use Topics     Alcohol use: Yes     Alcohol/week: 0.0 standard drinks     Comment: about 5 drinks per week     If you drink alcohol do you typically have >3 drinks per day or >7 drinks per week? No                     Reviewed orders with patient.  Reviewed health maintenance and updated orders accordingly - Yes  BP Readings from Last 3 Encounters:   01/24/20 104/74   09/17/19 124/83   07/08/19 110/68    Wt Readings from Last 3 Encounters:   01/24/20 74.4 kg (164 lb)   07/08/19 74.4 kg (164 lb)   06/29/19 73.5 kg (162 lb)            No lab results found.     Mammogram not appropriate for this patient based on age.    Pertinent mammograms are reviewed under the imaging tab.  History of abnormal Pap smear: NO - age 30-65 PAP every 5 years with negative HPV co-testing  "recommended  PAP / HPV 12/4/2015   PAP NIL     Reviewed and updated as needed this visit by clinical staff  Tobacco  Allergies  Meds  Problems  Med Hx  Surg Hx  Fam Hx  Soc Hx          Reviewed and updated as needed this visit by Provider  Tobacco  Allergies  Meds  Problems  Med Hx  Surg Hx  Fam Hx  Soc Hx           ROS:  CONSTITUTIONAL: NEGATIVE for fever, chills, change in weight  INTEGUMENTARU/SKIN: NEGATIVE for worrisome rashes, moles or lesions  EYES: NEGATIVE for vision changes or irritation  ENT: NEGATIVE for ear, mouth and throat problems  RESP: NEGATIVE for significant cough or SOB  BREAST: NEGATIVE for masses, tenderness or discharge  CV: NEGATIVE for chest pain, palpitations or peripheral edema  GI: NEGATIVE for nausea, abdominal pain, heartburn, or change in bowel habits  : NEGATIVE for unusual urinary or vaginal symptoms. Periods are regular.  MUSCULOSKELETAL: NEGATIVE for significant arthralgias or myalgia  NEURO: NEGATIVE for weakness, dizziness or paresthesias  PSYCHIATRIC: NEGATIVE for changes in mood or affect    OBJECTIVE:   /74 (Patient Position: Sitting, Cuff Size: Adult Regular)   Pulse 59   Temp 98.1  F (36.7  C) (Tympanic)   Resp 14   Ht 1.664 m (5' 5.5\")   Wt 74.4 kg (164 lb)   SpO2 100%   BMI 26.88 kg/m    EXAM:  GENERAL: healthy, alert and no distress  EYES: Eyes grossly normal to inspection, PERRL and conjunctivae and sclerae normal  HENT: ear canals and TM's normal, nose and mouth without ulcers or lesions  NECK: no adenopathy, no asymmetry, masses, or scars and thyroid normal to palpation  RESP: lungs clear to auscultation - no rales, rhonchi or wheezes  BREAST: normal without masses, tenderness or nipple discharge and no palpable axillary masses or adenopathy  CV: regular rate and rhythm, normal S1 S2, no S3 or S4, no murmur, click or rub, no peripheral edema and peripheral pulses strong  ABDOMEN: soft, nontender, no hepatosplenomegaly, no masses and bowel " "sounds normal   (female): normal female external genitalia, normal urethral meatus, vaginal mucosa pink, moist, well rugated, and normal cervix/adnexa/uterus without masses or discharge, IUD strings present.  MS: no gross musculoskeletal defects noted, no edema  SKIN: no suspicious lesions or rashes  NEURO: Normal strength and tone, mentation intact and speech normal  PSYCH: mentation appears normal, affect normal/bright    Diagnostic Test Results:  Labs reviewed in Epic    ASSESSMENT/PLAN:       ICD-10-CM    1. Routine history and physical examination of adult Z00.00 Lipid panel reflex to direct LDL Fasting     Glucose   2. Pap smear for cervical cancer screening Z12.4 Pap imaged thin layer screen with HPV - recommended age 30 - 65 years (select HPV order below)     HPV High Risk Types DNA Cervical   3. Screening examination for venereal disease Z11.3 Chlamydia trachomatis PCR     Neisseria gonorrhoeae PCR     HIV Antigen Antibody Combo     Treponema Abs w Reflex to RPR and Titer   4. IUD (intrauterine device) in place Z97.5        COUNSELING:   Reviewed preventive health counseling, as reflected in patient instructions    Estimated body mass index is 26.88 kg/m  as calculated from the following:    Height as of this encounter: 1.664 m (5' 5.5\").    Weight as of this encounter: 74.4 kg (164 lb).    Weight management plan: Discussed healthy diet and exercise guidelines     reports that she has never smoked. She has never used smokeless tobacco.      Counseling Resources:  ATP IV Guidelines  Pooled Cohorts Equation Calculator  Breast Cancer Risk Calculator  FRAX Risk Assessment  ICSI Preventive Guidelines  Dietary Guidelines for Americans, 2010  USDA's MyPlate  ASA Prophylaxis  Lung CA Screening    Diana Pena PA-C  [facility]  "

## 2020-01-25 LAB
HIV 1+2 AB+HIV1 P24 AG SERPL QL IA: NONREACTIVE
T PALLIDUM AB SER QL: NONREACTIVE

## 2020-01-26 LAB
C TRACH DNA SPEC QL NAA+PROBE: NEGATIVE
N GONORRHOEA DNA SPEC QL NAA+PROBE: NEGATIVE
SPECIMEN SOURCE: NORMAL
SPECIMEN SOURCE: NORMAL

## 2020-01-27 NOTE — RESULT ENCOUNTER NOTE
"Hi Anne-Marie,    I just wanted to let you know that your lab results have been reviewed and are attached.    - Your total cholesterol is HIGH (>200) but this is because you have high \"good\" (HDL) cholesterol so it is not a cause for concern.  - Your glucose (screening for diabetes) was normal.  - Your chlamydia and gonorrhea tests are negative for infection.  - Your treponema Pallidum test (Screening for Syphilis) is negative.  - HIV 1 and 2 Antibody test is negative for infection.    Please let me know if you have any questions and have a great week!    Sincerely,    Breana Pena PA-C    Family Medicine  [facility]  [address]  [place] [address]  [phone] (p)  "

## 2020-01-29 LAB
COPATH REPORT: NORMAL
PAP: NORMAL

## 2020-01-31 LAB
FINAL DIAGNOSIS: NORMAL
HPV HR 12 DNA CVX QL NAA+PROBE: NEGATIVE
HPV16 DNA SPEC QL NAA+PROBE: NEGATIVE
HPV18 DNA SPEC QL NAA+PROBE: NEGATIVE
SPECIMEN DESCRIPTION: NORMAL
SPECIMEN SOURCE CVX/VAG CYTO: NORMAL

## 2020-03-16 ENCOUNTER — VIRTUAL VISIT (OUTPATIENT)
Dept: FAMILY MEDICINE | Facility: OTHER | Age: 35
End: 2020-03-16

## 2020-03-16 ENCOUNTER — OFFICE VISIT - HEALTHEAST (OUTPATIENT)
Dept: FAMILY MEDICINE | Facility: CLINIC | Age: 35
End: 2020-03-16

## 2020-03-16 DIAGNOSIS — R05.9 COUGH: ICD-10-CM

## 2020-03-16 NOTE — PROGRESS NOTES
"Date: 2020 07:05:54  Clinician: Lara Kaiser  Clinician NPI: [id number]  Patient: Anne-Marie Hooks  Patient : 1985  [address] [place] [address] [place] [address]  Patient Phone: [phone]  Visit Protocol: URI  Patient Summary:  Anne-Marie is a 34 year old ( : 1985 ) female who initiated a Visit for COVID-19 (Coronavirus) evaluation and screening. When asked the question \"Please sign me up to receive news, health information and promotions from [employer].\", Anne-Marie responded \"No\".    Anne-Marie states her symptoms started 1-2 days ago.   Her symptoms consist of a headache and a cough.   Symptom details     Cough: Anne-Marie coughs a few times an hour and her cough is not more bothersome at night. Phlegm does not come into her throat when she coughs. She does not believe her cough is caused by post-nasal drip.     Headache: She states the headache is mild (1-3 on a 10 point pain scale).      Anne-Marie denies having ear pain, malaise, rhinitis, enlarged lymph nodes, facial pain or pressure, myalgias, fever, chills, wheezing, sore throat, nasal congestion, and teeth pain. She also denies having recent facial or sinus surgery in the past 60 days and taking antibiotic medication for the symptoms. She is not experiencing dyspnea.   Precipitating events  She has not recently been exposed to someone with influenza. Anne-Marie has been in close contact with the following high risk individuals: children under the age of 5.   Pertinent COVID-19 (Coronavirus) information  Anne-Marie has traveled internationally or to the areas where COVID-19 (Coronavirus) is widespread in the last 14 days before the start of her symptoms. Countries or locations traveled as reported by the patient (free text): Went through [place] on way back from [facility]   Anne-Marie has not had close contact with a suspected or laboratory-confirmed COVID-19 patient within 14 days of symptom onset.   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  "  Pertinent medical history  Anen-Marie does not get yeast infections when she takes antibiotics.   Anne-Marie does not need a return to work/school note.   Weight: 162 lbs   Anne-Marie does not smoke or use smokeless tobacco.   She denies pregnancy and denies breastfeeding. She does not menstruate.   Weight: 162 lbs    MEDICATIONS: fexofenadine oral, ALLERGIES: Penicillins, Cephalosporins, Bactrim  Clinician Response:  Dear Anne-Marie,   Dear Anne-Marie Hooks,  Based on the information you have provided, it is recommended that you go to one of our designated Corona Virus 19 testing centers to get a test done from your car. To do this follow these instructions:  You should go to one of our dedicated testing centers as soon as possible during the hours below at one of these locations:   Walk-in Care: [facility] at [address] [address], [place] [address]. Hours: M-F 7am - 6pm, Sat-Sun 8am -- 3pm  Walk-in Care: [facility] at [address] [place] [address]. Hours: M-F 7am - 6pm, Sat-Sun 8am -- 3pm  [address] [address]. Hours: M-F 11am -- 7pm, Sat-Sun 9am-4pm   What to expect:   When you arrive please come park in the parking lot.  Call [phone] and let them know which of the four clinics you are at, description of your car and where you are parked. Mention you did an [facility] visit and were sent for testing.  They will add you to the queue to get your test (you will stay in your car the entire time).  On that phone call you will give them the information to register your for the visit.  You will then be met by a provider who will perform a brief assessment in your car and collect samples to send for Corona Virus 19, influenza and possibly RSV.  You will be given patient information about respiratory illnesses and instructions. You with the results if you are not on mychart.   Isolate Yourself:   Isolate yourself while traveling.  Do Not allow any  visitors within 6 feet.  Do Not go to work or school.  Do Not go to [employer],  centers, shopping, or other public places.  Do Not shake hands.  Avoid close contact with others (hugging, kissing).  Protect Others:  Cover Your Mouth and Nose with a mask, disposable tissue or wash cloth to avoid spreading germs to others.  Wash your hands and face frequently with soap and water   Fever Medicines:   For fever relief, take acetaminophen or ibuprofen.  Treat fevers above 101deg F (38.3deg C) to lower fevers and make you more comfortable.   Acetaminophen (e.g., Tylenol): Take 650 mg (two 325 mg pills) by mouth every 4-6 hours as needed of regular strength Tylenol or 1,000 mg (two 500 mg pills) every 8 hours as needed of Extra Strength Tylenol.   Ibuprofen (e.g., Motrin, Advil): Take 400 mg (two 200 mg pills) by mouth every 6 hours as needed.   Acetaminophen is thought to be safer than ibuprofen or naproxen for people over 65 years old. Acetaminophen is in many OTC and prescription medicines. It might be in more than one medicine that you are taking. You need to be careful and not take an overdose. Before taking any medicine, read all the instructions on the package.  Caution -NSAIDs (e.g., ibuprofen, naproxen): Do not take nonsteroidal anti-inflammatory drugs (NSAIDs) if you have stomach problems, kidney disease, heart failure, or other contraindications to using this type of medicine. Do not take NSAID medicines for over 7 days without consulting your PCP. Do not take NSAID medicines if you are pregnant. Do not take NSAID medicines if you are also taking blood thinners.   Call Back If: Breathing difficulty develops or you become worse.  Thank you for limiting contact with others, wearing a simple mask to cover your cough, practice good hand hygiene habits and accessing our virtual services where possible to limit the spread of this virus.  For more information about COVID19 and options for caring for yourself at  home, please visit the CDC website at https://www.cdc.gov/coronavirus/2019-ncov/about/steps-when-sick.html  For more options for care at [facility], please visit our website at [email or web address]    Diagnosis: Cough  Diagnosis ICD: R05

## 2020-03-24 LAB
COVID OVERALL RESULT - HISTORICAL: NOT DETECTED
PAN SARS RNA (HISTORICAL CONVERSION): NEGATIVE
PATIENT SYMPTOMATIC (HISTORICAL CONVERSION): NORMAL
SARS COV 2 RNA - HISTORICAL: NEGATIVE
SOURCE ARUP COVID - HISTORICAL: NORMAL

## 2021-03-16 ENCOUNTER — OFFICE VISIT (OUTPATIENT)
Dept: FAMILY MEDICINE | Facility: CLINIC | Age: 36
End: 2021-03-16
Payer: COMMERCIAL

## 2021-03-16 VITALS
SYSTOLIC BLOOD PRESSURE: 108 MMHG | DIASTOLIC BLOOD PRESSURE: 73 MMHG | HEART RATE: 75 BPM | WEIGHT: 177 LBS | HEIGHT: 66 IN | OXYGEN SATURATION: 98 % | BODY MASS INDEX: 28.45 KG/M2 | TEMPERATURE: 97.7 F

## 2021-03-16 DIAGNOSIS — Z00.00 ROUTINE GENERAL MEDICAL EXAMINATION AT A HEALTH CARE FACILITY: Primary | ICD-10-CM

## 2021-03-16 DIAGNOSIS — Z23 NEED FOR TDAP VACCINATION: ICD-10-CM

## 2021-03-16 PROCEDURE — 90715 TDAP VACCINE 7 YRS/> IM: CPT | Performed by: NURSE PRACTITIONER

## 2021-03-16 PROCEDURE — 99395 PREV VISIT EST AGE 18-39: CPT | Mod: 25 | Performed by: NURSE PRACTITIONER

## 2021-03-16 PROCEDURE — 90471 IMMUNIZATION ADMIN: CPT | Performed by: NURSE PRACTITIONER

## 2021-03-16 ASSESSMENT — MIFFLIN-ST. JEOR: SCORE: 1516.68

## 2021-03-16 NOTE — PROGRESS NOTES
SUBJECTIVE:   CC: Anne-Marie Hooks is an 35 year old woman who presents for preventive health visit.       Patient has been advised of split billing requirements and indicates understanding: Yes  Healthy Habits:    Do you get at least three servings of calcium containing foods daily (dairy, green leafy vegetables, etc.)? yes    Amount of exercise or daily activities, outside of work: 6 day(s) per week    Problems taking medications regularly No    Medication side effects: No    Have you had an eye exam in the past two years? yes    Do you see a dentist twice per year? yes    Do you have sleep apnea, excessive snoring or daytime drowsiness?no    Tdap due.     In counseling for increased stressors with COVID-19. Works in Infectious Disease. Feels well managed.       Today's PHQ-2 Score:   PHQ-2 ( 1999 Pfizer) 3/16/2021 1/24/2020   Q1: Little interest or pleasure in doing things 1 0   Q2: Feeling down, depressed or hopeless 1 0   PHQ-2 Score 2 0   Q1: Little interest or pleasure in doing things Several days -   Q2: Feeling down, depressed or hopeless Several days -   PHQ-2 Score 2 -     Answers for HPI/ROS submitted by the patient on 3/16/2021   Chronic problems general questions HPI Form  How many servings of fruits and vegetables do you eat daily?: 2-3  On average, how many sweetened beverages do you drink each day (Examples: soda, juice, sweet tea, etc.  Do NOT count diet or artificially sweetened beverages)?: 0  How many minutes a day do you exercise enough to make your heart beat faster?: 30 to 60  How many days a week do you exercise enough to make your heart beat faster?: 6  How many days per week do you miss taking your medication?: 0    Abuse: Current or Past(Physical, Sexual or Emotional)- No  Do you feel safe in your environment? Yes    Have you ever done Advance Care Planning? (For example, a Health Directive, POLST, or a discussion with a medical provider or your loved ones about your wishes): No,  advance care planning information given to patient to review.  Patient declined advance care planning discussion at this time.    Social History     Tobacco Use     Smoking status: Never Smoker     Smokeless tobacco: Never Used   Substance Use Topics     Alcohol use: Yes     Alcohol/week: 0.0 standard drinks     Comment: about 5 drinks per week     If you drink alcohol do you typically have >3 drinks per day or >7 drinks per week? No                     Reviewed orders with patient.  Reviewed health maintenance and updated orders accordingly - Yes  Labs reviewed in [facility]    Breast CA Risk Screening:    Pertinent mammograms are reviewed under the imaging tab.    Pertinent mammograms are reviewed under the imaging tab.  History of abnormal Pap smear: NO - age 30-65 PAP every 5 years with negative HPV co-testing recommended  PAP / HPV Latest Ref Rng & Units 1/24/2020 12/4/2015   PAP - NIL NIL   HPV 16 DNA NEG:Negative Negative -   HPV 18 DNA NEG:Negative Negative -   OTHER HR HPV NEG:Negative Negative -     Reviewed and updated as needed this visit by clinical staff  Tobacco  Allergies  Meds  Problems  Med Hx  Surg Hx  Fam Hx  Soc Hx          Reviewed and updated as needed this visit by Provider  Tobacco  Allergies  Meds  Problems  Med Hx  Surg Hx  Fam Hx         Past Medical History:   Diagnosis Date     Migraine       Past Surgical History:   Procedure Laterality Date     BIOPSY  2006?    mole removed     EYE SURGERY         ROS:  CONSTITUTIONAL: NEGATIVE for fever, chills, change in weight  INTEGUMENTARU/SKIN: NEGATIVE for worrisome rashes, moles or lesions  EYES: NEGATIVE for vision changes or irritation  ENT: NEGATIVE for ear, mouth and throat problems  RESP: NEGATIVE for significant cough or SOB  BREAST: NEGATIVE for masses, tenderness or discharge  CV: NEGATIVE for chest pain, palpitations or peripheral edema  GI: NEGATIVE for nausea, abdominal pain, heartburn, or change in bowel habits  :  "NEGATIVE for unusual urinary or vaginal symptoms. Periods are regular.  MUSCULOSKELETAL: NEGATIVE for significant arthralgias or myalgia  NEURO: NEGATIVE for weakness, dizziness or paresthesias  PSYCHIATRIC: NEGATIVE for changes in mood or affect    OBJECTIVE:   /73   Pulse 75   Temp 97.7  F (36.5  C) (Oral)   Ht 1.664 m (5' 5.5\")   Wt 80.3 kg (177 lb)   SpO2 98%   BMI 29.01 kg/m    EXAM:  GENERAL: healthy, alert and no distress  EYES: Eyes grossly normal to inspection, PERRL and conjunctivae and sclerae normal  HENT: ear canals and TM's normal, nose and mouth without ulcers or lesions  NECK: no adenopathy, no asymmetry, masses, or scars and thyroid normal to palpation  RESP: lungs clear to auscultation - no rales, rhonchi or wheezes  BREAST: normal without masses, tenderness or nipple discharge and no palpable axillary masses or adenopathy  CV: regular rate and rhythm, normal S1 S2, no S3 or S4, no murmur, click or rub, no peripheral edema and peripheral pulses strong  ABDOMEN: soft, nontender, no hepatosplenomegaly, no masses and bowel sounds normal  MS: no gross musculoskeletal defects noted, no edema  SKIN: no suspicious lesions or rashes  NEURO: Normal strength and tone, mentation intact and speech normal  PSYCH: mentation appears normal, affect normal/bright    Diagnostic Test Results:  Labs reviewed in Epic    ASSESSMENT/PLAN:   Anne-Marie was seen today for physical.    Diagnoses and all orders for this visit:    Routine general medical examination at a health care facility  No concerns. Pap smear up to date. Continue with counseling and activity.   Recommended vitamin D 5704-1272 international unit(s)/day    Need for Tdap vaccination  -     TDAP VACCINE (Adacel, Boostrix)  [[id number]]    Other orders  -     REVIEW OF HEALTH MAINTENANCE PROTOCOL ORDERS        Patient has been advised of split billing requirements and indicates understanding: Yes  COUNSELING:   Reviewed preventive health counseling, as " "reflected in patient instructions       Regular exercise       Healthy diet/nutrition    Estimated body mass index is 29.01 kg/m  as calculated from the following:    Height as of this encounter: 1.664 m (5' 5.5\").    Weight as of this encounter: 80.3 kg (177 lb).    Weight management plan: Discussed healthy diet and exercise guidelines    She reports that she has never smoked. She has never used smokeless tobacco.      Counseling Resources:  ATP IV Guidelines  Pooled Cohorts Equation Calculator  Breast Cancer Risk Calculator  BRCA-Related Cancer Risk Assessment: FHS-7 Tool  FRAX Risk Assessment  ICSI Preventive Guidelines  Dietary Guidelines for Americans, 2010  USDA's MyPlate  ASA Prophylaxis  Lung CA Screening    [facility]  "

## 2021-06-06 NOTE — PROGRESS NOTES
SUBJECTIVE: Here for curbside evaluation for coronaviruse referred through [facility].     Reports no new symptoms.     OBJECTIVE: no apparent distress  Eyes appear normal  Mucous membranes moist  Non diaphoretic.   No increased work of breathing   Mental status appears normal/affect normal         1. Cough  Coronavirus SARS-CoV-2 by PCR    over the counter meds and isolation discussed until results in.   Education information given. Time of visit was 15 minutes more than half in coordination of care and counseling regarding COVID and self-isolation

## 2021-06-07 NOTE — PROGRESS NOTES
Coronavirus (COVID-19) Notification    Patient notified of Negative COVID-19.    Patient can discontinue Quarantee and is free to resume normal activites.  If Patient has questions that you are not able to answer they can contact PCP or MD hotline ([phone])    Please Contact your PCP clinic or return to the Emergency department if your:  Symptoms worsen or other concerning symptom's.  Tess/Erin Wallace

## 2021-09-18 ENCOUNTER — HEALTH MAINTENANCE LETTER (OUTPATIENT)
Age: 36
End: 2021-09-18

## 2021-10-22 ENCOUNTER — OFFICE VISIT (OUTPATIENT)
Dept: INTERNAL MEDICINE | Facility: CLINIC | Age: 36
End: 2021-10-22
Payer: COMMERCIAL

## 2021-10-22 VITALS
HEART RATE: 99 BPM | TEMPERATURE: 97.4 F | WEIGHT: 182 LBS | DIASTOLIC BLOOD PRESSURE: 60 MMHG | BODY MASS INDEX: 29.83 KG/M2 | OXYGEN SATURATION: 100 % | SYSTOLIC BLOOD PRESSURE: 112 MMHG

## 2021-10-22 DIAGNOSIS — M94.0 COSTOCHONDRITIS: Primary | ICD-10-CM

## 2021-10-22 PROCEDURE — 99213 OFFICE O/P EST LOW 20 MIN: CPT | Performed by: PHYSICIAN ASSISTANT

## 2021-10-22 NOTE — PATIENT INSTRUCTIONS
Patient Education     Costochondritis  Costochondritis is inflammation of a rib or the cartilage that connects a rib to your breastbone (sternum). It causes soreness, and may cause chest pain that can be sharp or aching or feel like pressure. The pain may get worse with deep breathing, movement, or exercise. In some cases, the pain is mistaken for a heart attack. But the condition is not serious. Read on to learn more about the condition and how it can be treated.     What causes costochondritis?  The cause is not fully known. It may happen after a chest injury, chest infection, or bout of coughing. Some physical activities may lead to costochondritis. Large-breasted women may be more likely to have the condition. Often, the cause is unknown.   Diagnosing costochondritis  There is no test for costochondritis. The condition is diagnosed by the symptoms you have. Your healthcare provider will give you a physical exam. He or she will ask you about your symptoms and examine your chest for pain. In some cases, tests are done to rule out more serious problems. These tests may include tests such as chest X-ray, CT scan, or an ECG.   Treating costochondritis  If a cause is found, treatment for that will likely relieve the problem. Costochondritis often goes away on its own. The course of the condition varies from person to person. It usually lasts from weeks to months. In some cases, mild symptoms continue for months to years. To ease symptoms:     Take medicine as directed. These relieve pain and swelling. Ibuprofen or other NSAIDs are often advised. In some cases, you may be given prescription medicine, such as muscle relaxants.    Don't do activities that put stress on your chest or spine.    Apply a heating pad (set to warm, not high heat) to your breastbone several times a day.    Do stretching exercises as directed.  When to call the healthcare provider  Call the healthcare provider right away if you have any of  these:    Pain that is not relieved by medicine    Shortness of breath    Lightheadedness, dizziness, or fainting    Feeling of irregular heartbeat or fast pulse  Anyone with chest pain should see a healthcare provider, especially older adults and people at risk for heart disease.   [employer] last reviewed this educational content on 2/1/2020 2000-2021 [employer]. All rights reserved. This information is not intended as a substitute for professional medical care. Always follow your healthcare professional's instructions.

## 2021-10-22 NOTE — PROGRESS NOTES
"  Assessment & Plan     Costochondritis                 BMI:   Estimated body mass index is 29.83 kg/m  as calculated from the following:    Height as of 3/16/21: 1.664 m (5' 5.5\").    Weight as of this encounter: 82.6 kg (182 lb).   Weight management plan: Patient was referred to their PCP to discuss a diet and exercise plan.    Patient Instructions     Patient Education     Costochondritis  Costochondritis is inflammation of a rib or the cartilage that connects a rib to your breastbone (sternum). It causes soreness, and may cause chest pain that can be sharp or aching or feel like pressure. The pain may get worse with deep breathing, movement, or exercise. In some cases, the pain is mistaken for a heart attack. But the condition is not serious. Read on to learn more about the condition and how it can be treated.     What causes costochondritis?  The cause is not fully known. It may happen after a chest injury, chest infection, or bout of coughing. Some physical activities may lead to costochondritis. Large-breasted women may be more likely to have the condition. Often, the cause is unknown.   Diagnosing costochondritis  There is no test for costochondritis. The condition is diagnosed by the symptoms you have. Your healthcare provider will give you a physical exam. He or she will ask you about your symptoms and examine your chest for pain. In some cases, tests are done to rule out more serious problems. These tests may include tests such as chest X-ray, CT scan, or an ECG.   Treating costochondritis  If a cause is found, treatment for that will likely relieve the problem. Costochondritis often goes away on its own. The course of the condition varies from person to person. It usually lasts from weeks to months. In some cases, mild symptoms continue for months to years. To ease symptoms:     Take medicine as directed. These relieve pain and swelling. Ibuprofen or other NSAIDs are often advised. In some cases, you may " be given prescription medicine, such as muscle relaxants.    Don't do activities that put stress on your chest or spine.    Apply a heating pad (set to warm, not high heat) to your breastbone several times a day.    Do stretching exercises as directed.  When to call the healthcare provider  Call the healthcare provider right away if you have any of these:    Pain that is not relieved by medicine    Shortness of breath    Lightheadedness, dizziness, or fainting    Feeling of irregular heartbeat or fast pulse  Anyone with chest pain should see a healthcare provider, especially older adults and people at risk for heart disease.   [employer] last reviewed this educational content on 2/1/2020 2000-2021 [employer]. All rights reserved. This information is not intended as a substitute for professional medical care. Always follow your healthcare professional's instructions.               Return in about 4 weeks (around 11/19/2021) for recheck if not improving, regular primary provider.    Kimmie Olivia PA-C  [facility]   Anne-Marie is a 36 year old who presents for the following health issues     HPI     Chest Pain  Onset/Duration: 3 weeks  Description:   Location: right side/center  Character: dull  Radiation: none  Duration: constant   Intensity: moderate  Progression of Symptoms: same  Accompanying Signs & Symptoms:  Shortness of breath: no  Sweating: no  Nausea/vomiting: no  Lightheadedness: no  Palpitations: no  Fever/Chills: no  Cough: no           Heartburn: no  History:   Family history of heart disease: YES  Tobacco use: no  Previous similar symptoms: no   Precipitating factors:   Worse with exertion: no  Worse with deep breaths: no           Related to eating: no           Better with burping: no  Alleviating factors: none  Therapies tried and outcome: none  Ran in [employer]   Right side of chest at breast bone area.             Review  of Systems   Constitutional, HEENT, cardiovascular, pulmonary, gi and gu systems are negative, except as otherwise noted.      Objective    /60   Pulse 99   Temp 97.4  F (36.3  C) (Oral)   Wt 82.6 kg (182 lb)   SpO2 100%   BMI 29.83 kg/m    Body mass index is 29.83 kg/m .  Physical Exam   GENERAL: healthy, alert and no distress  NECK: no adenopathy, no asymmetry, masses, or scars and thyroid normal to palpation  RESP: lungs clear to auscultation - no rales, rhonchi or wheezes  CV: regular rate and rhythm, normal S1 S2, no S3 or S4, no murmur, click or rub, no peripheral edema and peripheral pulses strong  MS: extremities normal- no gross deformities noted  Area of pain very focal at the sternal border with rib on the right   SKIN: no suspicious lesions or rashes

## 2022-04-11 SDOH — ECONOMIC STABILITY: INCOME INSECURITY: HOW HARD IS IT FOR YOU TO PAY FOR THE VERY BASICS LIKE FOOD, HOUSING, MEDICAL CARE, AND HEATING?: NOT HARD AT ALL

## 2022-04-11 SDOH — ECONOMIC STABILITY: FOOD INSECURITY: WITHIN THE PAST 12 MONTHS, YOU WORRIED THAT YOUR FOOD WOULD RUN OUT BEFORE YOU GOT MONEY TO BUY MORE.: NEVER TRUE

## 2022-04-11 SDOH — HEALTH STABILITY: PHYSICAL HEALTH: ON AVERAGE, HOW MANY MINUTES DO YOU ENGAGE IN EXERCISE AT THIS LEVEL?: 50 MIN

## 2022-04-11 SDOH — ECONOMIC STABILITY: FOOD INSECURITY: WITHIN THE PAST 12 MONTHS, THE FOOD YOU BOUGHT JUST DIDN'T LAST AND YOU DIDN'T HAVE MONEY TO GET MORE.: NEVER TRUE

## 2022-04-11 SDOH — ECONOMIC STABILITY: TRANSPORTATION INSECURITY
IN THE PAST 12 MONTHS, HAS LACK OF TRANSPORTATION KEPT YOU FROM MEETINGS, WORK, OR FROM GETTING THINGS NEEDED FOR DAILY LIVING?: NO

## 2022-04-11 SDOH — HEALTH STABILITY: PHYSICAL HEALTH: ON AVERAGE, HOW MANY DAYS PER WEEK DO YOU ENGAGE IN MODERATE TO STRENUOUS EXERCISE (LIKE A BRISK WALK)?: 5 DAYS

## 2022-04-11 SDOH — ECONOMIC STABILITY: TRANSPORTATION INSECURITY
IN THE PAST 12 MONTHS, HAS THE LACK OF TRANSPORTATION KEPT YOU FROM MEDICAL APPOINTMENTS OR FROM GETTING MEDICATIONS?: NO

## 2022-04-11 SDOH — ECONOMIC STABILITY: INCOME INSECURITY: IN THE LAST 12 MONTHS, WAS THERE A TIME WHEN YOU WERE NOT ABLE TO PAY THE MORTGAGE OR RENT ON TIME?: NO

## 2022-04-11 ASSESSMENT — SOCIAL DETERMINANTS OF HEALTH (SDOH)
ARE YOU MARRIED, WIDOWED, DIVORCED, SEPARATED, NEVER MARRIED, OR LIVING WITH A PARTNER?: NEVER MARRIED
DO YOU BELONG TO ANY CLUBS OR ORGANIZATIONS SUCH AS CHURCH GROUPS UNIONS, FRATERNAL OR ATHLETIC GROUPS, OR SCHOOL GROUPS?: NO
IN A TYPICAL WEEK, HOW MANY TIMES DO YOU TALK ON THE PHONE WITH FAMILY, FRIENDS, OR NEIGHBORS?: MORE THAN THREE TIMES A WEEK
HOW OFTEN DO YOU ATTEND CHURCH OR RELIGIOUS SERVICES?: NEVER
HOW OFTEN DO YOU GET TOGETHER WITH FRIENDS OR RELATIVES?: TWICE A WEEK

## 2022-04-11 ASSESSMENT — ENCOUNTER SYMPTOMS
HEMATURIA: 0
DYSURIA: 0
HEMATOCHEZIA: 0
DIZZINESS: 0
DIARRHEA: 0
PALPITATIONS: 0
NERVOUS/ANXIOUS: 0
EYE PAIN: 0
HEARTBURN: 0
ABDOMINAL PAIN: 0
JOINT SWELLING: 0
HEADACHES: 0
CHILLS: 0
WEAKNESS: 0
ARTHRALGIAS: 0
MYALGIAS: 0
FREQUENCY: 0
SORE THROAT: 0
NAUSEA: 0
FEVER: 0
SHORTNESS OF BREATH: 0
COUGH: 0
PARESTHESIAS: 0
CONSTIPATION: 0

## 2022-04-11 ASSESSMENT — LIFESTYLE VARIABLES
SKIP TO QUESTIONS 9-10: 0
AUDIT-C TOTAL SCORE: 4
HOW OFTEN DO YOU HAVE SIX OR MORE DRINKS ON ONE OCCASION: LESS THAN MONTHLY
HOW OFTEN DO YOU HAVE A DRINK CONTAINING ALCOHOL: 2-3 TIMES A WEEK
HOW MANY STANDARD DRINKS CONTAINING ALCOHOL DO YOU HAVE ON A TYPICAL DAY: 1 OR 2

## 2022-04-13 ENCOUNTER — OFFICE VISIT (OUTPATIENT)
Dept: FAMILY MEDICINE | Facility: CLINIC | Age: 37
End: 2022-04-13
Payer: COMMERCIAL

## 2022-04-13 VITALS
HEIGHT: 65 IN | SYSTOLIC BLOOD PRESSURE: 111 MMHG | TEMPERATURE: 98.3 F | OXYGEN SATURATION: 98 % | HEART RATE: 67 BPM | WEIGHT: 183 LBS | RESPIRATION RATE: 16 BRPM | DIASTOLIC BLOOD PRESSURE: 75 MMHG | BODY MASS INDEX: 30.49 KG/M2

## 2022-04-13 DIAGNOSIS — Z88.9 MULTIPLE ALLERGIES: ICD-10-CM

## 2022-04-13 DIAGNOSIS — Z00.00 ROUTINE GENERAL MEDICAL EXAMINATION AT A HEALTH CARE FACILITY: Primary | ICD-10-CM

## 2022-04-13 DIAGNOSIS — N63.21 LUMP IN UPPER OUTER QUADRANT OF LEFT BREAST: ICD-10-CM

## 2022-04-13 PROBLEM — M94.0 COSTOCHONDRITIS: Status: RESOLVED | Noted: 2022-04-13 | Resolved: 2022-04-13

## 2022-04-13 PROBLEM — M94.0 COSTOCHONDRITIS: Status: ACTIVE | Noted: 2022-04-13

## 2022-04-13 PROCEDURE — 99213 OFFICE O/P EST LOW 20 MIN: CPT | Mod: 25 | Performed by: FAMILY MEDICINE

## 2022-04-13 PROCEDURE — 99395 PREV VISIT EST AGE 18-39: CPT | Performed by: FAMILY MEDICINE

## 2022-04-13 RX ORDER — FAMOTIDINE 10 MG
10 TABLET ORAL 2 TIMES DAILY PRN
COMMUNITY

## 2022-04-13 RX ORDER — FLUTICASONE PROPIONATE 50 MCG
SPRAY, SUSPENSION (ML) NASAL
COMMUNITY

## 2022-04-13 ASSESSMENT — ENCOUNTER SYMPTOMS
CONSTIPATION: 0
DYSURIA: 0
ARTHRALGIAS: 0
HEMATURIA: 0
DIARRHEA: 0
ABDOMINAL PAIN: 0
COUGH: 0
NAUSEA: 0
PALPITATIONS: 0
CHILLS: 0
WEAKNESS: 0
EYE PAIN: 0
FREQUENCY: 0
SHORTNESS OF BREATH: 0
HEMATOCHEZIA: 0
NERVOUS/ANXIOUS: 0
SORE THROAT: 0
MYALGIAS: 0
FEVER: 0
JOINT SWELLING: 0
DIZZINESS: 0
HEARTBURN: 0
PARESTHESIAS: 0
HEADACHES: 0

## 2022-04-13 NOTE — PROGRESS NOTES
SUBJECTIVE:   CC: Anne-Marie Hooks is an 36 year old woman who presents for preventive health visit.     Here for physical.    Concerns:  Costochondritis after a marathon last fall, went away, does still get some chest pains on occasion.      Wonders about a spot with more dense tissue on the breast, not quite a lump per se, wondering if it needs to get checked out.    Concerns about allergies, has both fruit and veggie, medication, and seasonal allergies.  Would like an allergy referral.    Patient has been advised of split billing requirements and indicates understanding: Yes  Healthy Habits:     Getting at least 3 servings of Calcium per day:  Yes    Bi-annual eye exam:  Yes    Dental care twice a year:  Yes    Sleep apnea or symptoms of sleep apnea:  None    Diet:  Regular (no restrictions) and Other    Frequency of exercise:  4-5 days/week    Duration of exercise:  45-60 minutes    Taking medications regularly:  Yes    Medication side effects:  None    PHQ-2 Total Score: 0    Additional concerns today:  Yes          Today's PHQ-2 Score:   PHQ-2 ( 1999 [employer]) 4/11/2022   Q1: Little interest or pleasure in doing things 0   Q2: Feeling down, depressed or hopeless 0   PHQ-2 Score 0   PHQ-2 Total Score (12-17 Years)- Positive if 3 or more points; Administer PHQ-A if positive -   Q1: Little interest or pleasure in doing things Not at all   Q2: Feeling down, depressed or hopeless Not at all   PHQ-2 Score 0       Abuse: Current or Past (Physical, Sexual or Emotional) - No  Do you feel safe in your environment? Yes        Social History     Tobacco Use     Smoking status: Never Smoker     Smokeless tobacco: Never Used   Substance Use Topics     Alcohol use: Yes     Alcohol/week: 0.0 standard drinks     Comment: about 5 drinks per week         Alcohol Use 4/11/2022   Prescreen: >3 drinks/day or >7 drinks/week? No   Prescreen: >3 drinks/day or >7 drinks/week? -       Reviewed orders with patient.  Reviewed health  maintenance and updated orders accordingly - Yes  Lab work is in process  Labs reviewed in EPIC  BP Readings from Last 3 Encounters:   04/13/22 111/75   10/22/21 112/60   03/16/21 108/73    Wt Readings from Last 3 Encounters:   04/13/22 83 kg (183 lb)   10/22/21 82.6 kg (182 lb)   03/16/21 80.3 kg (177 lb)                  Patient Active Problem List   Diagnosis     Seasonal allergic rhinitis     IUD (intrauterine device) in place     Multiple allergies     Past Surgical History:   Procedure Laterality Date     BIOPSY  2006?    mole removed     EYE SURGERY         Social History     Tobacco Use     Smoking status: Never Smoker     Smokeless tobacco: Never Used   Substance Use Topics     Alcohol use: Yes     Alcohol/week: 0.0 standard drinks     Comment: about 5 drinks per week     Family History   Problem Relation Age of Onset     Hyperlipidemia Mother      Thyroid Disease Mother         hyper     Skin Cancer Mother      Hyperlipidemia Father      Thyroid Disease Sister         hypo     Diabetes Maternal Grandmother      Hypertension Maternal Grandmother      Hyperlipidemia Maternal Grandmother      Obesity Maternal Grandmother      Coronary Artery Disease Maternal Grandfather      Skin Cancer Maternal Grandfather      Breast Cancer Paternal Grandmother      Cerebrovascular Disease Paternal Grandfather      Skin Cancer Maternal Aunt          Current Outpatient Medications   Medication Sig Dispense Refill     famotidine (PEPCID) 10 MG tablet Take 10 mg by mouth 2 times daily as needed       Fexofenadine HCl (ALLEGRA PO) Take 180 mg by mouth       fluticasone (FLONASE) 50 MCG/ACT nasal spray        levonorgestrel (MIRENA) 20 MCG/DAY IUD 1 each by Intrauterine route once       VITAMIN D PO        Allergies   Allergen Reactions     Bactrim [Sulfamethoxazole W/Trimethoprim] Rash     Cephalosporins      Penicillins      Recent Labs   Lab Test 01/24/20  0831   *   HDL 76   TRIG 37        Breast Cancer  Screening:    FHS-7:   Breast CA Risk Assessment (FHS-7) 4/11/2022   Did any of your first-degree relatives have breast or ovarian cancer? No   Did any of your relatives have bilateral breast cancer? Unknown   Did any man in your family have breast cancer? No   Did any woman in your family have breast and ovarian cancer? No   Did any woman in your family have breast cancer before age 50 y? No   Do you have 2 or more relatives with breast and/or ovarian cancer? Unknown   Do you have 2 or more relatives with breast and/or bowel cancer? No       Patient under 40 years of age: Routine Mammogram Screening not recommended.   Pertinent mammograms are reviewed under the imaging tab.    History of abnormal Pap smear: NO - age 30-65 PAP every 5 years with negative HPV co-testing recommended  PAP / HPV Latest Ref Rng & Units 1/24/2020 12/4/2015   PAP (Historical) - NIL NIL   HPV16 NEG:Negative Negative -   HPV18 NEG:Negative Negative -   HRHPV NEG:Negative Negative -     Reviewed and updated as needed this visit by clinical staff   Tobacco  Allergies  Meds   Med Hx  Surg Hx  Fam Hx  Soc Hx          Reviewed and updated as needed this visit by Provider    Allergies  Meds               Past Medical History:   Diagnosis Date     Costochondritis 4/13/2022     Migraine      Other migraine without status migrainosus, not intractable 12/4/2015      Past Surgical History:   Procedure Laterality Date     BIOPSY  2006?    mole removed     EYE SURGERY         Review of Systems   Constitutional: Negative for chills and fever.   HENT: Negative for congestion, ear pain, hearing loss and sore throat.    Eyes: Negative for pain and visual disturbance.   Respiratory: Negative for cough and shortness of breath.    Cardiovascular: Negative for chest pain, palpitations and peripheral edema.   Gastrointestinal: Negative for abdominal pain, constipation, diarrhea, heartburn, hematochezia and nausea.   Breasts:  Positive for tenderness.  "Negative for discharge.   Genitourinary: Negative for dysuria, frequency, genital sores, hematuria, pelvic pain, urgency, vaginal bleeding and vaginal discharge.   Musculoskeletal: Negative for arthralgias, joint swelling and myalgias.   Skin: Negative for rash.   Neurological: Negative for dizziness, weakness, headaches and paresthesias.   Psychiatric/Behavioral: Negative for mood changes. The patient is not nervous/anxious.      Questionable referred pain from costochondritis versus breast itself, intermittent, mild pains     OBJECTIVE:   /75 (BP Location: Right arm, Patient Position: Sitting, Cuff Size: Adult Large)   Pulse 67   Temp 98.3  F (36.8  C) (Oral)   Resp 16   Ht 1.651 m (5' 5\")   Wt 83 kg (183 lb)   SpO2 98%   BMI 30.45 kg/m    Physical Exam  GENERAL: healthy, alert and no distress  EYES: Eyes grossly normal to inspection, PERRL and conjunctivae and sclerae normal  HENT: ear canals and TM's normal, nose and mouth without ulcers or lesions  NECK: no adenopathy, no asymmetry, masses, or scars and thyroid normal to palpation  RESP: lungs clear to auscultation - no rales, rhonchi or wheezes  BREAST: Palpable asymmetry at the left upper outer quadrant of the breast, approximately 1 to 2 o'clock position, estimated 4 to 5 cm from the nipple  CV: regular rate and rhythm, normal S1 S2, no S3 or S4, no murmur, click or rub, no peripheral edema and peripheral pulses strong  ABDOMEN: soft, nontender, no hepatosplenomegaly, no masses and bowel sounds normal  MS: no gross musculoskeletal defects noted, no edema  SKIN: no suspicious lesions or rashes  NEURO: Normal strength and tone, mentation intact and speech normal  PSYCH: mentation appears normal, affect normal/bright    Diagnostic Test Results:  Labs reviewed in Epic    ASSESSMENT/PLAN:   (Z00.00) Routine general medical examination at a health care facility  (primary encounter diagnosis)  Comment: Exam completed today, routine health maintenance " "items updated as able.  Labs ordered.  Follow up one year or sooner as needed.      (Z88.9) Multiple allergies  Comment: We will send patient to allergy for testing.  Plan: Adult Allergy/Asthma Referral, Adult         Allergy/Asthma Referral            (N63.23) Lump in upper outer quadrant of left breast  Comment: Patient does have a palpable abnormality, therefore we will send her for mammogram and ultrasound.  Recommendations pending results.  Plan: US Breast Left Limited 1-3 Quadrants, CANCELED:        MA Diagnostic Left w/Didier              Patient has been advised of split billing requirements and indicates understanding: Yes    COUNSELING:  Reviewed preventive health counseling, as reflected in patient instructions    Estimated body mass index is 30.45 kg/m  as calculated from the following:    Height as of this encounter: 1.651 m (5' 5\").    Weight as of this encounter: 83 kg (183 lb).    Weight management plan: Discussed healthy diet and exercise guidelines    She reports that she has never smoked. She has never used smokeless tobacco.      Counseling Resources:  ATP IV Guidelines  Pooled Cohorts Equation Calculator  Breast Cancer Risk Calculator  BRCA-Related Cancer Risk Assessment: FHS-7 Tool  FRAX Risk Assessment  ICSI Preventive Guidelines  Dietary Guidelines for Americans, 2010  USDA's MyPlate  ASA Prophylaxis  Lung CA Screening    April J. Michelle Caputo MD  [facility]"

## 2022-04-14 ENCOUNTER — HOSPITAL ENCOUNTER (OUTPATIENT)
Dept: MAMMOGRAPHY | Facility: CLINIC | Age: 37
Discharge: HOME | End: 2022-04-14
Attending: FAMILY MEDICINE
Payer: COMMERCIAL

## 2022-04-14 DIAGNOSIS — N63.23 LUMP IN LOWER OUTER QUADRANT OF LEFT BREAST: ICD-10-CM

## 2022-04-14 PROCEDURE — 77066 DX MAMMO INCL CAD BI: CPT

## 2022-04-14 PROCEDURE — 76642 ULTRASOUND BREAST LIMITED: CPT | Mod: LT

## 2022-04-19 ENCOUNTER — HOSPITAL ENCOUNTER (OUTPATIENT)
Dept: MAMMOGRAPHY | Facility: CLINIC | Age: 37
Discharge: HOME | End: 2022-04-19
Attending: FAMILY MEDICINE
Payer: COMMERCIAL

## 2022-04-19 DIAGNOSIS — N63.23 LUMP IN LOWER OUTER QUADRANT OF LEFT BREAST: ICD-10-CM

## 2022-04-19 PROCEDURE — 272N000431 US BREAST BIOPSY CORE NEEDLE LEFT

## 2022-04-19 PROCEDURE — 19083 BX BREAST 1ST LESION US IMAG: CPT | Mod: LT

## 2022-04-19 PROCEDURE — 88305 TISSUE EXAM BY PATHOLOGIST: CPT | Mod: TC | Performed by: FAMILY MEDICINE

## 2022-04-19 PROCEDURE — 250N000009 HC RX 250: Performed by: FAMILY MEDICINE

## 2022-04-19 PROCEDURE — 88305 TISSUE EXAM BY PATHOLOGIST: CPT | Mod: 26 | Performed by: PATHOLOGY

## 2022-04-19 PROCEDURE — 999N000065 MA POST PROCEDURE LEFT

## 2022-04-19 RX ADMIN — LIDOCAINE HYDROCHLORIDE 5 ML: 10 INJECTION, SOLUTION INFILTRATION; PERINEURAL at 07:47

## 2022-04-19 NOTE — DISCHARGE INSTRUCTIONS

## 2022-04-20 ENCOUNTER — TELEPHONE (OUTPATIENT)
Dept: MAMMOGRAPHY | Facility: CLINIC | Age: 37
End: 2022-04-20
Payer: COMMERCIAL

## 2022-04-20 LAB
PATH REPORT.COMMENTS IMP SPEC: NORMAL
PATH REPORT.COMMENTS IMP SPEC: NORMAL
PATH REPORT.FINAL DX SPEC: NORMAL
PATH REPORT.GROSS SPEC: NORMAL
PATH REPORT.MICROSCOPIC SPEC OTHER STN: NORMAL
PHOTO IMAGE: NORMAL

## 2022-04-20 NOTE — TELEPHONE ENCOUNTER
After review by Breast Center Radiologist, Dr. Nighat Mahoney, Anne-Marie was called,  verified, and given her 2022 Left Breast Biopsy results (Benign Breast Tissue) and recommended Follow up (Clinical/Annual Screening mammograms @ age 40).   Patient denies any concerns with the biopsy site. Ordering provider was informed of the results and follow up plan.  I encouraged her to contact her doctor with any further breast concerns.  Patient verbalized understanding and agrees with the plan of care.      [facility]  Anne-Marie Hooks [id number]  F, 1985  Surgical Pathology Report (Final result) [id number]  Authorizing Provider: Rukhsana Lazo MD Ordering Provider: Rukhsana Lazo MD  Ordering Location: [facility]  Collected: 2022 07:47 AM  Pathologist: Ilda Tejeda MD Received: 2022 11:50 AM  .  Specimens  A Breast, Left, Left breast ultrasound core biopsy, 1:00  .  .  Final Diagnosis  Left breast, 1 o'clock position and 4-6 cm from the nipple, 1.3 cm size, low suspicion, ultrasound-guided core biopsy:  --Negative for atypia or malignancy.  -Benign beast tissue with minimal nonproliferative fibrocystic changes and increased fibrous component  Electronically signed by Ilda Tejeda MD on 2022 at 12:27 PM      Jeane Valles RN BSN  Procedure Nurse  [facility]  [phone]

## 2022-05-06 ENCOUNTER — OFFICE VISIT (OUTPATIENT)
Dept: DERMATOLOGY | Facility: CLINIC | Age: 37
End: 2022-05-06
Payer: COMMERCIAL

## 2022-05-06 VITALS — SYSTOLIC BLOOD PRESSURE: 111 MMHG | DIASTOLIC BLOOD PRESSURE: 78 MMHG

## 2022-05-06 DIAGNOSIS — D48.5 NEOPLASM OF UNCERTAIN BEHAVIOR OF SKIN: ICD-10-CM

## 2022-05-06 PROCEDURE — 88305 TISSUE EXAM BY PATHOLOGIST: CPT | Performed by: PATHOLOGY

## 2022-05-06 PROCEDURE — 11102 TANGNTL BX SKIN SINGLE LES: CPT | Performed by: PHYSICIAN ASSISTANT

## 2022-05-06 PROCEDURE — 11103 TANGNTL BX SKIN EA SEP/ADDL: CPT | Performed by: PHYSICIAN ASSISTANT

## 2022-05-06 PROCEDURE — 88342 IMHCHEM/IMCYTCHM 1ST ANTB: CPT | Performed by: PATHOLOGY

## 2022-05-06 PROCEDURE — 99207 PR DROP WITH A PROCEDURE: CPT | Performed by: PHYSICIAN ASSISTANT

## 2022-05-06 NOTE — LETTER
5/6/2022         RE: Anne-Marie Hooks  [address] [place] [facility] [address]        Dear Colleague,    Thank you for referring your patient, Anne-Marie Hooks, to the [facility]. Please see a copy of my visit note below.    HPI:   Chief complaints: Anne-Marie Hooks is a pleasant 36 year old female who presents for evaluation of a growing spot on the left side of the nose. She has had the area for awhile but it has gotten larger. Additionally she has a mole on the right chest that her PCP noticed that is irregular.     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 Mood appropriate  Alert and Oriented X 3. Well developed, well nourished in no distress.  General appearance: Normal  Head including face: Normal  Eyes: conjunctiva and lids: Normal  Mouth: Lips, teeth, gums: Normal  Neck: Normal  Cardiovascular: Exam of peripheral vascular system by observation for swelling, varicosities, edema: Normal  Right upper extremity: Normal  Left upper extremity: Normal  Right lower extremity: Normal  Left lower extremity: Normal  Skin: Scalp and body hair: See below    4 mm brown papule on the left alar rim  4 mm irregular brown macule on the right chest    ASSESSMENT/PLAN:     1. Fibrous papule vs dermal nevus r/o BCC on the left alar rim. Shave biopsy performed.  Area cleaned and anesthetized with 1% lidocaine with epinephrine.  Dermablade used to remove the lesion and sent to pathology. Bleeding was cauterized. Pt tolerated procedure well with no complications.   2. Atypical nevus on the right chest. Shave biopsy performed.  Area cleaned and anesthetized with 1% lidocaine with epinephrine.  Dermablade used to remove the lesion and sent to pathology. Bleeding was cauterized. Pt tolerated procedure well with no complications.             Follow-up: pending path  CC:    Scribed By: Maddi Mccartney, MS, PAShaquilleC          Again, thank you for allowing me to participate in the care of your patient.        Sincerely,        Maddi Mccartney PA-C

## 2022-05-06 NOTE — PATIENT INSTRUCTIONS
Wound Care Instructions     FOR SUPERFICIAL WOUNDS     [facility]  [phone]                 AFTER 24 HOURS YOU SHOULD REMOVE THE BANDAGE AND BEGIN DAILY DRESSING CHANGES AS FOLLOWS:     1) Remove Dressing.     2) Clean and dry the area with tap water using a Q-tip or sterile gauze pad.     3) Apply Vaseline, Aquaphor, Polysporin ointment or Bacitracin ointment over entire wound.  Do NOT use Neosporin ointment.     4) Cover the wound with a band-aid, or a sterile non-stick gauze pad and micropore paper tape    REPEAT THESE INSTRUCTIONS AT LEAST ONCE A DAY UNTIL THE WOUND HAS COMPLETELY HEALED.    It is an old wives tale that a wound heals better when it is exposed to air and allowed to dry out. The wound will heal faster with a better cosmetic result if it is kept moist with ointment and covered with a bandage.    **Do not let the wound dry out.**    Supplies Needed:      *Cotton tipped applicators (Q-tips)    *Vaseline, Aquaphor, Polysporin or Bacitracin Ointment (NOT NEOSPORIN)    *Band-aids or non-stick gauze pads and micropore paper tape.      PATIENT INFORMATION:    During the healing process you will notice a number of changes. All wounds develop a small halo of redness surrounding the wound.  This means healing is occurring. Severe itching with extensive redness usually indicates sensitivity to the ointment or bandage tape used to dress the wound.  You should call our office if this develops.      Swelling  and/or discoloration around your surgical site is common, particularly when performed around the eye.    All wounds normally drain.  The larger the wound the more drainage there will be.  After 7-10 days, you will notice the wound beginning to shrink and new skin will begin to grow.  The wound is healed when you can see skin has formed over the entire area.  A healed wound has a healthy, shiny look to the surface and is red to dark pink in color to normalize.  Wounds may  take approximately 4-6 weeks to heal.  Larger wounds may take 6-8 weeks.  After the wound is healed you may discontinue dressing changes.    You may experience a sensation of tightness as your wound heals. This is normal and will gradually subside.    Your healed wound may be sensitive to temperature changes. This sensitivity improves with time, but if you re having a lot of discomfort, try to avoid temperature extremes.    Patients frequently experience itching after their wound appears to have healed because of the continue healing under the skin.  Plain Vaseline will help relieve the itching.      POSSIBLE COMPLICATIONS    BLEEDING:    Leave the bandage in place.  Use tightly rolled up gauze or a cloth to apply direct pressure over the bandage for 30  minutes.  Reapply pressure for an additional 30 minutes if necessary  Use additional gauze and tape to maintain pressure once the bleeding has stopped.

## 2022-05-06 NOTE — PROGRESS NOTES
HPI:   Chief complaints: Anne-Marie Hooks is a pleasant 36 year old female who presents for evaluation of a growing spot on the left side of the nose. She has had the area for awhile but it has gotten larger. Additionally she has a mole on the right chest that her PCP noticed that is irregular.     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 Mood appropriate  Alert and Oriented X 3. Well developed, well nourished in no distress.  General appearance: Normal  Head including face: Normal  Eyes: conjunctiva and lids: Normal  Mouth: Lips, teeth, gums: Normal  Neck: Normal  Cardiovascular: Exam of peripheral vascular system by observation for swelling, varicosities, edema: Normal  Right upper extremity: Normal  Left upper extremity: Normal  Right lower extremity: Normal  Left lower extremity: Normal  Skin: Scalp and body hair: See below    4 mm brown papule on the left alar rim  4 mm irregular brown macule on the right chest    ASSESSMENT/PLAN:     1. Fibrous papule vs dermal nevus r/o BCC on the left alar rim. Shave biopsy performed.  Area cleaned and anesthetized with 1% lidocaine with epinephrine.  Dermablade used to remove the lesion and sent to pathology. Bleeding was cauterized. Pt tolerated procedure well with no complications.   2. Atypical nevus on the right chest. Shave biopsy performed.  Area cleaned and anesthetized with 1% lidocaine with epinephrine.  Dermablade used to remove the lesion and sent to pathology. Bleeding was cauterized. Pt tolerated procedure well with no complications.             Follow-up: pending path  CC:   Scribed By: Maddi Mccartney, MS, PAVERONIKA

## 2022-05-11 ENCOUNTER — TELEPHONE (OUTPATIENT)
Dept: DERMATOLOGY | Facility: CLINIC | Age: 37
End: 2022-05-11
Payer: COMMERCIAL

## 2022-05-11 LAB
PATH REPORT.COMMENTS IMP SPEC: NORMAL
PATH REPORT.FINAL DX SPEC: NORMAL
PATH REPORT.GROSS SPEC: NORMAL
PATH REPORT.MICROSCOPIC SPEC OTHER STN: NORMAL
PATH REPORT.RELEVANT HX SPEC: NORMAL

## 2022-05-11 NOTE — LETTER
[facility]  [address] [place] [address]  [phone]      May 11, 2022    Anne-Marie Hooks  [address]  [facility] [address]      Dear Anne-Marie      You are scheduled for Mohs Surgery on: Monday June 6, 2022 at 7:45 am    Please check in at 2nd floor D[facility]ology [facility].     You don't need to arrive more than 5-10 minutes prior to your appointment time.     Be sure to eat a good breakfast and bathe and wash your hair prior to Surgery. Please bring  with you if this is above your neck    If you are taking any anti-coagulants that are prescribed by your Doctor (such as Coumadin/warfarin, Plavix, Aspirin, Ibuprofen), please continue taking them.     However, If you are taking anti-coagulants over the counter without  a Doctor's order for a Medical condition, please discontinue them 10 days prior to Surgery.      Please wear loose comfortable clothing as it could possibly be 4-6 hours until your surgery is completed depending upon how many layers of tissue need to be removed.     Thank you,    Brandon Friend MD

## 2022-05-11 NOTE — TELEPHONE ENCOUNTER
----- Message from Maddi Mccartney PA-C sent at 5/11/2022  2:42 PM CDT -----  Left alar rim fibrous papule no further treatment  Right chest severely atypical nevus please schedule re-excision. She should have yearly FSE.

## 2022-05-11 NOTE — TELEPHONE ENCOUNTER
S/w pt and gave Maddi's reply below.    Scheduled for Mohs on 6/6 at 7:45 in [place] with Dr. Friend.    Mohs letter and information mailed to pt and sent via my chart.    Lori SKAGGS RN  [facility] [place]  [phone]

## 2022-06-06 ENCOUNTER — OFFICE VISIT (OUTPATIENT)
Dept: DERMATOLOGY | Facility: CLINIC | Age: 37
End: 2022-06-06
Payer: COMMERCIAL

## 2022-06-06 VITALS — SYSTOLIC BLOOD PRESSURE: 100 MMHG | DIASTOLIC BLOOD PRESSURE: 76 MMHG | OXYGEN SATURATION: 100 % | HEART RATE: 82 BPM

## 2022-06-06 DIAGNOSIS — D48.5 NEOPLASM OF UNCERTAIN BEHAVIOR OF SKIN: Primary | ICD-10-CM

## 2022-06-06 PROCEDURE — 11602 EXC TR-EXT MAL+MARG 1.1-2 CM: CPT | Performed by: DERMATOLOGY

## 2022-06-06 PROCEDURE — 99207 PR NO CHARGE LOS: CPT | Performed by: DERMATOLOGY

## 2022-06-06 PROCEDURE — 88331 PATH CONSLTJ SURG 1 BLK 1SPC: CPT | Performed by: DERMATOLOGY

## 2022-06-06 NOTE — NURSING NOTE
"Initial /76 (BP Location: Left arm)   Pulse 82   SpO2 100%  Estimated body mass index is 30.45 kg/m  as calculated from the following:    Height as of 4/13/22: 1.651 m (5' 5\").    Weight as of 4/13/22: 83 kg (183 lb). .      "

## 2022-06-06 NOTE — LETTER
6/6/2022         RE: Anne-Marie Hooks  [address]  [facility] [address]        Dear Colleague,    Thank you for referring your patient, Anne-Marie Hooks, to the [facility]. Please see a copy of my visit note below.    Surgical Office Location :   [facility]  [address] [place] [address]      Anne-Marie Hooks is an extremely pleasant 36 year old year old female patient here today for evaluation and managment of severely atypical nevus on right breast.  Patient has no other skin complaints today.  Remainder of the HPI, Meds, PMH, Allergies, FH, and SH was reviewed in chart.      Past Medical History:   Diagnosis Date     Costochondritis 4/13/2022     Migraine      Other migraine without status migrainosus, not intractable 12/4/2015       Past Surgical History:   Procedure Laterality Date     BIOPSY  2006?    mole removed     EYE SURGERY          Family History   Problem Relation Age of Onset     Hyperlipidemia Mother      Thyroid Disease Mother         hyper     Skin Cancer Mother      Hyperlipidemia Father      Thyroid Disease Sister         hypo     Diabetes Maternal Grandmother      Hypertension Maternal Grandmother      Hyperlipidemia Maternal Grandmother      Obesity Maternal Grandmother      Coronary Artery Disease Maternal Grandfather      Skin Cancer Maternal Grandfather      Breast Cancer Paternal Grandmother      Cerebrovascular Disease Paternal Grandfather      Skin Cancer Maternal Aunt        Social History     Socioeconomic History     Marital status: Single     Spouse name: Not on file     Number of children: Not on file     Years of education: Not on file     Highest education level: Not on file   Occupational History     Not on file   Tobacco Use     Smoking status: Never Smoker     Smokeless tobacco: Never Used   Vaping Use     Vaping Use: Never used   Substance and Sexual Activity     Alcohol use: Yes     Alcohol/week: 0.0 standard drinks      Comment: about 5 drinks per week     Drug use: No     Sexual activity: Not Currently     Partners: Male     Birth control/protection: I.U.D.   Other Topics Concern     Parent/sibling w/ CABG, MI or angioplasty before 65F 55M? No   Social History Narrative     Not on file     Social Determinants of Health     Financial Resource Strain: Low Risk      Difficulty of Paying Living Expenses: Not hard at all   Food Insecurity: No Food Insecurity     Worried About Running Out of Food in the Last Year: Never true     Ran Out of Food in the Last Year: Never true   Transportation Needs: No Transportation Needs     Lack of Transportation (Medical): No     Lack of Transportation (Non-Medical): No   Physical Activity: Sufficiently Active     Days of Exercise per Week: 5 days     Minutes of Exercise per Session: 50 min   Stress: Stress Concern Present     Feeling of Stress : To some extent   Social Connections: Socially Isolated     Frequency of Communication with Friends and Family: More than three times a week     Frequency of Social Gatherings with Friends and Family: Twice a week     Attends Church Services: Never     Active Member of Clubs or Organizations: No     Attends Club or Organization Meetings: Not on file     Marital Status: Never    Intimate Partner Violence: Not on file   Housing Stability: Low Risk      Unable to Pay for Housing in the Last Year: No     Number of Places Lived in the Last Year: 1     Unstable Housing in the Last Year: No       Outpatient Encounter Medications as of 6/6/2022   Medication Sig Dispense Refill     famotidine (PEPCID) 10 MG tablet Take 10 mg by mouth 2 times daily as needed       Fexofenadine HCl (ALLEGRA PO) Take 180 mg by mouth       fluticasone (FLONASE) 50 MCG/ACT nasal spray        levonorgestrel (MIRENA) 20 MCG/DAY IUD 1 each by Intrauterine route once       VITAMIN D PO        No facility-administered encounter medications on file as of 6/6/2022.             O:   NAD,  WDWN, Alert & Oriented, Mood & Affect wnl, Vitals stable   Here today alone   /76 (BP Location: Left arm)   Pulse 82   SpO2 100%    General appearance normal   Vitals stable   Alert, oriented and in no acute distress     R breast 5mm red macule      Eyes: Conjunctivae/lids:Normal     ENT: Lips, buccal mucosa, tongue: normal    MSK:Normal    Cardiovascular: peripheral edema none    Pulm: Breathing Normal    Neuro/Psych: Orientation:Alert and Orientedx3 ; Mood/Affect:normal       MICRO:   R breast:Unremarkable epidermis, dermis and superficial subcutis.  No concerning areas for malignancy.     A/P:  1. Atypical nevus  Atypical moles are unusual benign moles that may resemble melanoma. People who have them are at increased risk of developing single or multiple melanomas. The higher the number of these moles someone has, the higher the risk; those who have 10 or more have 12 times the risk of developing melanoma compared to the general population. Atypical nevi are found significantly more often in melanoma patients than in the general population.    While atypical moles are considered to be pre-cancerous (more likely to turn into melanoma than regular moles), not everyone who has atypical moles gets melanoma. In fact, most moles -- both ordinary and atypical ones -- never become cancerous. Thus the removal of all atypical nevi is unnecessary. In fact, most of the melanomas found on people with atypical moles arise from normal skin and not an atypical mole.    Although a physician bases the initial diagnosis of atypical moles on a physical examination, removing several moles and examining them under a microscope must confirm the diagnosis. This procedure, is called a biopsy.    A pathologist will examine the tissue under a microscope and make the precise diagnosis. Diagnosis by biopsy is not exact, and in difficult cases doctors may split 50/50 down the middle as to whether a mole is melanoma or benign. If the  "pathologist uses the term \"severely dysplastic\" or \"atypical melanocytic hyperplasia\" or offers a long descriptive narrative it means he really is concerned about melanoma, but does not want to call it that.    Most dermatologists usually recommend that all patients with these severely dysplastic moles have them removed. Also many dermatologists recommend removing \"moderate dysplasia\" moles, if the biopsy didn't get all of it. Those with \"mild dysplasia\" can usually be left alone or watched.    R breast severely atypical nevus  EXCISION OF Atypical nevus, Margins confirmed with FROZEN SECTIONS, MART1 stain AND Second intent: After thorough discussion of PGACAC, consent obtained, anesthesia and prep, the margins of the lesion were identified and an incision was made encompassing the lesion with 3mm margin. The incisions were made through the skin and down to and including the superficial subcutis.  The lesion was removed en bloc and submitted for frozen section pathologic review. Clear margins obtained (1.1cm).  MART1 stain performed one 1 section(s).    REPAIR SECOND INTENT: We discussed the options for wound management in full with the patient including risks/benefits/possible outcomes. Decision made to allow the wound to heal by second intention. EBL minimal; complications none; wound care routine.  The patient was discharged in good condition and will return in one month or prn for wound evaluation.     It was a pleasure speaking to Anne-Marie Hooks today.  Previous clinic notes and pertinent laboratory tests were reviewed prior to Anne-Marie Hooks's visit.  Patient encouraged to perform monthly skin exams.  UV precautions reviewed with patient.  Return to clinic 6 monts        Again, thank you for allowing me to participate in the care of your patient.        Sincerely,        Brandon Friend MD  "

## 2022-06-06 NOTE — PATIENT INSTRUCTIONS
Open Wound Care     for right chest    No strenuous activity for 48 hours    Take Tylenol as needed for discomfort.                                                .         Do not drink alcoholic beverages for 48 hours.    Keep the pressure bandage in place for 24 hours. If the bandage becomes blood tinged or loose, reinforce it with gauze and tape.        (Refer to the reverse side of this page for management of bleeding).    Remove bandage in 24 hours and begin wound care as follows:     Clean area with tap water using a Q tip or gauze pad, (shower / bathe normally)  Dry wound with Q tip or gauze pad  Apply Aquaphor, Vaseline, Polysporin or Bacitracin Ointment with a Q tip  Do NOT use Neosporin Ointment *  Cover the wound with a band-aid or nonstick gauze pad and paper tape.  Repeat wound care once a day until wound is completely healed.    It is an old wives tale that a wound heals better when it is exposed to air and allowed to dry out. The wound will heal faster with a better cosmetic result if it is kept moist with ointment and covered with a bandage.  Do not let the wound dry out.      Supplies Needed:                Qtips or gauze pads                Polysporin or Bacitracin Ointment                Bandaids or nonstick gauze pads and paper tape    Wound care kits and brown paper tape are available for purchase at   the pharmacy.    BLEEDING:    Use tightly rolled up gauze or cloth to apply direct pressure over the bandage for 20   minutes.  Reapply pressure for an additional 20 minutes if necessary  Call the office or go to the nearest emergency room if pressure fails to stop the bleeding.  Use additional gauze and tape to maintain pressure once the bleeding has stopped.  Begin wound care 24 hours after surgery as directed.                  WOUND HEALING    One week after surgery a pink / red halo will form around the outside of the wound.   This is new skin.  The center of the wound will appear yellowish  white and produce some drainage.  The pink halo will slowly migrate in toward the center of the wound until the wound is covered with new shiny pink skin.  There will be no more drainage when the wound is completely healed.  It will take six months to one year for the redness to fade.  The scar may be itchy, tight and sensitive to extreme temperatures for a year after the surgery.  Massaging the area several times a day for several minutes after the wound is completely healed will help the scar soften and normalize faster. Begin massage only after healing is complete.      In case of emergency call: Dr Friend: [phone]    [facility]: [phone]    [facility]:[phone]

## 2022-06-06 NOTE — PROGRESS NOTES
Surgical Office Location :   [facility]  [address] [place] [address]

## 2022-06-06 NOTE — PROGRESS NOTES
Anne-Marie Hooks is an extremely pleasant 36 year old year old female patient here today for evaluation and managment of severely atypical nevus on right breast.  Patient has no other skin complaints today.  Remainder of the HPI, Meds, PMH, Allergies, FH, and SH was reviewed in chart.      Past Medical History:   Diagnosis Date     Costochondritis 4/13/2022     Migraine      Other migraine without status migrainosus, not intractable 12/4/2015       Past Surgical History:   Procedure Laterality Date     BIOPSY  2006?    mole removed     EYE SURGERY          Family History   Problem Relation Age of Onset     Hyperlipidemia Mother      Thyroid Disease Mother         hyper     Skin Cancer Mother      Hyperlipidemia Father      Thyroid Disease Sister         hypo     Diabetes Maternal Grandmother      Hypertension Maternal Grandmother      Hyperlipidemia Maternal Grandmother      Obesity Maternal Grandmother      Coronary Artery Disease Maternal Grandfather      Skin Cancer Maternal Grandfather      Breast Cancer Paternal Grandmother      Cerebrovascular Disease Paternal Grandfather      Skin Cancer Maternal Aunt        Social History     Socioeconomic History     Marital status: Single     Spouse name: Not on file     Number of children: Not on file     Years of education: Not on file     Highest education level: Not on file   Occupational History     Not on file   Tobacco Use     Smoking status: Never Smoker     Smokeless tobacco: Never Used   Vaping Use     Vaping Use: Never used   Substance and Sexual Activity     Alcohol use: Yes     Alcohol/week: 0.0 standard drinks     Comment: about 5 drinks per week     Drug use: No     Sexual activity: Not Currently     Partners: Male     Birth control/protection: I.U.D.   Other Topics Concern     Parent/sibling w/ CABG, MI or angioplasty before 65F 55M? No   Social History Narrative     Not on file     Social Determinants of Health     Financial Resource Strain: Low Risk       Difficulty of Paying Living Expenses: Not hard at all   Food Insecurity: No Food Insecurity     Worried About Running Out of Food in the Last Year: Never true     Ran Out of Food in the Last Year: Never true   Transportation Needs: No Transportation Needs     Lack of Transportation (Medical): No     Lack of Transportation (Non-Medical): No   Physical Activity: Sufficiently Active     Days of Exercise per Week: 5 days     Minutes of Exercise per Session: 50 min   Stress: Stress Concern Present     Feeling of Stress : To some extent   Social Connections: Socially Isolated     Frequency of Communication with Friends and Family: More than three times a week     Frequency of Social Gatherings with Friends and Family: Twice a week     Attends Caodaism Services: Never     Active Member of Clubs or Organizations: No     Attends Club or Organization Meetings: Not on file     Marital Status: Never    Intimate Partner Violence: Not on file   Housing Stability: Low Risk      Unable to Pay for Housing in the Last Year: No     Number of Places Lived in the Last Year: 1     Unstable Housing in the Last Year: No       Outpatient Encounter Medications as of 6/6/2022   Medication Sig Dispense Refill     famotidine (PEPCID) 10 MG tablet Take 10 mg by mouth 2 times daily as needed       Fexofenadine HCl (ALLEGRA PO) Take 180 mg by mouth       fluticasone (FLONASE) 50 MCG/ACT nasal spray        levonorgestrel (MIRENA) 20 MCG/DAY IUD 1 each by Intrauterine route once       VITAMIN D PO        No facility-administered encounter medications on file as of 6/6/2022.             O:   NAD, WDWN, Alert & Oriented, Mood & Affect wnl, Vitals stable   Here today alone   /76 (BP Location: Left arm)   Pulse 82   SpO2 100%    General appearance normal   Vitals stable   Alert, oriented and in no acute distress     R breast 5mm red macule      Eyes: Conjunctivae/lids:Normal     ENT: Lips, buccal mucosa, tongue:  "normal    MSK:Normal    Cardiovascular: peripheral edema none    Pulm: Breathing Normal    Neuro/Psych: Orientation:Alert and Orientedx3 ; Mood/Affect:normal       MICRO:   R breast:Unremarkable epidermis, dermis and superficial subcutis.  No concerning areas for malignancy.     A/P:  1. Atypical nevus  Atypical moles are unusual benign moles that may resemble melanoma. People who have them are at increased risk of developing single or multiple melanomas. The higher the number of these moles someone has, the higher the risk; those who have 10 or more have 12 times the risk of developing melanoma compared to the general population. Atypical nevi are found significantly more often in melanoma patients than in the general population.    While atypical moles are considered to be pre-cancerous (more likely to turn into melanoma than regular moles), not everyone who has atypical moles gets melanoma. In fact, most moles -- both ordinary and atypical ones -- never become cancerous. Thus the removal of all atypical nevi is unnecessary. In fact, most of the melanomas found on people with atypical moles arise from normal skin and not an atypical mole.    Although a physician bases the initial diagnosis of atypical moles on a physical examination, removing several moles and examining them under a microscope must confirm the diagnosis. This procedure, is called a biopsy.    A pathologist will examine the tissue under a microscope and make the precise diagnosis. Diagnosis by biopsy is not exact, and in difficult cases doctors may split 50/50 down the middle as to whether a mole is melanoma or benign. If the pathologist uses the term \"severely dysplastic\" or \"atypical melanocytic hyperplasia\" or offers a long descriptive narrative it means he really is concerned about melanoma, but does not want to call it that.    Most dermatologists usually recommend that all patients with these severely dysplastic moles have them removed. Also " "many dermatologists recommend removing \"moderate dysplasia\" moles, if the biopsy didn't get all of it. Those with \"mild dysplasia\" can usually be left alone or watched.    R breast severely atypical nevus  EXCISION OF Atypical nevus, Margins confirmed with FROZEN SECTIONS, MART1 stain AND Second intent: After thorough discussion of [facility], consent obtained, anesthesia and prep, the margins of the lesion were identified and an incision was made encompassing the lesion with 3mm margin. The incisions were made through the skin and down to and including the superficial subcutis.  The lesion was removed en bloc and submitted for frozen section pathologic review. Clear margins obtained (1.1cm).  MART1 stain performed one 1 section(s).    REPAIR SECOND INTENT: We discussed the options for wound management in full with the patient including risks/benefits/possible outcomes. Decision made to allow the wound to heal by second intention. EBL minimal; complications none; wound care routine.  The patient was discharged in good condition and will return in one month or prn for wound evaluation.     It was a pleasure speaking to Anne-Marie Hooks today.  Previous clinic notes and pertinent laboratory tests were reviewed prior to Anne-Marie Hooks's visit.  Patient encouraged to perform monthly skin exams.  UV precautions reviewed with patient.  Return to clinic 6 monts    "

## 2022-11-04 ENCOUNTER — TELEPHONE (OUTPATIENT)
Dept: ALLERGY | Facility: CLINIC | Age: 37
End: 2022-11-04

## 2022-11-15 ENCOUNTER — OFFICE VISIT (OUTPATIENT)
Dept: DERMATOLOGY | Facility: CLINIC | Age: 37
End: 2022-11-15
Payer: COMMERCIAL

## 2022-11-15 DIAGNOSIS — L81.4 LENTIGO: ICD-10-CM

## 2022-11-15 DIAGNOSIS — D48.5 NEOPLASM OF UNCERTAIN BEHAVIOR OF SKIN: ICD-10-CM

## 2022-11-15 DIAGNOSIS — Z87.898 HISTORY OF ATYPICAL NEVUS: ICD-10-CM

## 2022-11-15 DIAGNOSIS — D18.01 ANGIOMA OF SKIN: ICD-10-CM

## 2022-11-15 DIAGNOSIS — D22.9 NEVUS: Primary | ICD-10-CM

## 2022-11-15 PROCEDURE — 11102 TANGNTL BX SKIN SINGLE LES: CPT | Performed by: PHYSICIAN ASSISTANT

## 2022-11-15 PROCEDURE — 99213 OFFICE O/P EST LOW 20 MIN: CPT | Mod: 25 | Performed by: PHYSICIAN ASSISTANT

## 2022-11-15 PROCEDURE — 88305 TISSUE EXAM BY PATHOLOGIST: CPT | Performed by: DERMATOLOGY

## 2022-11-15 NOTE — PATIENT INSTRUCTIONS
Wound Care Instructions     FOR SUPERFICIAL WOUNDS     [facility]  [phone]                 AFTER 24 HOURS YOU SHOULD REMOVE THE BANDAGE AND BEGIN DAILY DRESSING CHANGES AS FOLLOWS:     1) Remove Dressing.     2) Clean and dry the area with tap water using a Q-tip or sterile gauze pad.     3) Apply Vaseline, Aquaphor, Polysporin ointment or Bacitracin ointment over entire wound.  Do NOT use Neosporin ointment.     4) Cover the wound with a band-aid, or a sterile non-stick gauze pad and micropore paper tape    REPEAT THESE INSTRUCTIONS AT LEAST ONCE A DAY UNTIL THE WOUND HAS COMPLETELY HEALED.    It is an old wives tale that a wound heals better when it is exposed to air and allowed to dry out. The wound will heal faster with a better cosmetic result if it is kept moist with ointment and covered with a bandage.    **Do not let the wound dry out.**    Supplies Needed:      *Cotton tipped applicators (Q-tips)    *Vaseline, Aquaphor, Polysporin or Bacitracin Ointment (NOT NEOSPORIN)    *Band-aids or non-stick gauze pads and micropore paper tape.      PATIENT INFORMATION:    During the healing process you will notice a number of changes. All wounds develop a small halo of redness surrounding the wound.  This means healing is occurring. Severe itching with extensive redness usually indicates sensitivity to the ointment or bandage tape used to dress the wound.  You should call our office if this develops.      Swelling  and/or discoloration around your surgical site is common, particularly when performed around the eye.    All wounds normally drain.  The larger the wound the more drainage there will be.  After 7-10 days, you will notice the wound beginning to shrink and new skin will begin to grow.  The wound is healed when you can see skin has formed over the entire area.  A healed wound has a healthy, shiny look to the surface and is red to dark pink in color to normalize.  Wounds may  take approximately 4-6 weeks to heal.  Larger wounds may take 6-8 weeks.  After the wound is healed you may discontinue dressing changes.    You may experience a sensation of tightness as your wound heals. This is normal and will gradually subside.    Your healed wound may be sensitive to temperature changes. This sensitivity improves with time, but if you re having a lot of discomfort, try to avoid temperature extremes.    Patients frequently experience itching after their wound appears to have healed because of the continue healing under the skin.  Plain Vaseline will help relieve the itching.      POSSIBLE COMPLICATIONS    BLEEDING:    Leave the bandage in place.  Use tightly rolled up gauze or a cloth to apply direct pressure over the bandage for 30  minutes.  Reapply pressure for an additional 30 minutes if necessary  Use additional gauze and tape to maintain pressure once the bleeding has stopped.

## 2022-11-15 NOTE — PROGRESS NOTES
HPI:   Chief complaints: Anne-Marie Hooks is a pleasant 37 year old female who presents for Full skin cancer screening to rule out skin cancer   Last Skin Exam: n/a      1st Baseline: yes  Personal HX of Skin Cancer: no   Personal HX of Malignant Melanoma: no   Family HX of Skin Cancer / Malignant Melanoma: no  Personal HX of Atypical Moles:   Yes severely atypical nevus on the chest 2022  Risk factors: history of sun exposure and burns  New / Changing lesions:none  Social History: just ran the [place] marathon! 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   ROS of the following were done and are negative: Constitutional, Eyes, Ears, Nose,   Mouth, Throat, Cardiovascular, Respiratory, GI, Genitourinary, Musculoskeletal,   Psychiatric, Endocrine, Allergic/Immunologic.    PHYSICAL EXAM:   There were no vitals taken for this visit.  Skin exam performed as follows: Type 2 skin. Mood appropriate  Alert and Oriented X 3. Well developed, well nourished in no distress.  General appearance: Normal  Head including face: Normal  Eyes: conjunctiva and lids: Normal  Mouth: Lips, teeth, gums: Normal  Neck: Normal  Chest-breast/axillae: Normal  Back: Normal  Spleen and liver: Normal  Cardiovascular: Exam of peripheral vascular system by observation for swelling, varicosities, edema: Normal  Genitalia: groin, buttocks: Normal  Extremities: digits/nails (clubbing): Normal  Eccrine and Apocrine glands: Normal  Right upper extremity: Normal  Left upper extremity: Normal  Right lower extremity: Normal  Left lower extremity: Normal  Skin: Scalp and body hair: See below    Pt deferred exam of breasts, groin, buttocks: No    Other physical findings:  1. Multiple pigmented macules on extremities and trunk  2. Multiple pigmented macules on face, trunk and extremities  3. Multiple vascular papules on trunk, arms and legs  4. 7 mm  irregular pink brown macule on the mid upper back     Except as noted above, no other signs of skin cancer or melanoma.     ASSESSMENT/PLAN:   Benign Full skin cancer screening today. . Patient with history of atypical nevus  Advised on monthly self exams and 1 year  Patient Education: Appropriate brochures given.    1. Multiple benign appearing melanocytic nevi on arms, legs and trunk. Discussed ABCDEs of melanoma and sunscreen.   2. Multiple lentigos on arms, legs and trunk. Advised benign, no treatment needed.  3. Multiple scattered angiomas. Advised benign, no treatment needed.   4. Atypical nevus on the mid upper back. Shave biopsy performed.  Area cleaned and anesthetized with 1% lidocaine with epinephrine.  Dermablade used to remove the lesion and sent to pathology. Bleeding was cauterized. Pt tolerated procedure well with no complications.               Follow-up: yearly/PRN sooner    1.) Patient was asked about new and changing moles. YES  2.) Patient received a complete physical skin examination: YES  3.) Patient was counseled to perform a monthly self skin examination: YES  Scribed By: Maddi Mccartney MS, PAVERONIKA

## 2022-11-15 NOTE — LETTER
11/15/2022         RE: Anne-Marie Hooks  [address] [facility] [address]        Dear Colleague,    Thank you for referring your patient, Anne-Marie Hooks, to the [facility]. Please see a copy of my visit note below.    HPI:   Chief complaints: Anne-Marie Hooks is a pleasant 37 year old female who presents for Full skin cancer screening to rule out skin cancer   Last Skin Exam: n/a      1st Baseline: yes  Personal HX of Skin Cancer: no   Personal HX of Malignant Melanoma: no   Family HX of Skin Cancer / Malignant Melanoma: no  Personal HX of Atypical Moles:   Yes severely atypical nevus on the chest 2022  Risk factors: history of sun exposure and burns  New / Changing lesions:none  Social History: just ran the [place] marathon! 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   ROS of the following were done and are negative: Constitutional, Eyes, Ears, Nose,   Mouth, Throat, Cardiovascular, Respiratory, GI, Genitourinary, Musculoskeletal,   Psychiatric, Endocrine, Allergic/Immunologic.    PHYSICAL EXAM:   There were no vitals taken for this visit.  Skin exam performed as follows: Type 2 skin. Mood appropriate  Alert and Oriented X 3. Well developed, well nourished in no distress.  General appearance: Normal  Head including face: Normal  Eyes: conjunctiva and lids: Normal  Mouth: Lips, teeth, gums: Normal  Neck: Normal  Chest-breast/axillae: Normal  Back: Normal  Spleen and liver: Normal  Cardiovascular: Exam of peripheral vascular system by observation for swelling, varicosities, edema: Normal  Genitalia: groin, buttocks: Normal  Extremities: digits/nails (clubbing): Normal  Eccrine and Apocrine glands: Normal  Right upper extremity: Normal  Left upper extremity: Normal  Right lower extremity: Normal  Left lower extremity: Normal  Skin: Scalp and body hair: See  below    Pt deferred exam of breasts, groin, buttocks: No    Other physical findings:  1. Multiple pigmented macules on extremities and trunk  2. Multiple pigmented macules on face, trunk and extremities  3. Multiple vascular papules on trunk, arms and legs  4. 7 mm irregular pink brown macule on the mid upper back     Except as noted above, no other signs of skin cancer or melanoma.     ASSESSMENT/PLAN:   Benign Full skin cancer screening today. . Patient with history of atypical nevus  Advised on monthly self exams and 1 year  Patient Education: Appropriate brochures given.    1. Multiple benign appearing melanocytic nevi on arms, legs and trunk. Discussed ABCDEs of melanoma and sunscreen.   2. Multiple lentigos on arms, legs and trunk. Advised benign, no treatment needed.  3. Multiple scattered angiomas. Advised benign, no treatment needed.   4. Atypical nevus on the mid upper back. Shave biopsy performed.  Area cleaned and anesthetized with 1% lidocaine with epinephrine.  Dermablade used to remove the lesion and sent to pathology. Bleeding was cauterized. Pt tolerated procedure well with no complications.               Follow-up: yearly/PRN sooner    1.) Patient was asked about new and changing moles. YES  2.) Patient received a complete physical skin examination: YES  3.) Patient was counseled to perform a monthly self skin examination: YES  Scribed By: Maddi Mccartney, MS, PAVERONIKA          Again, thank you for allowing me to participate in the care of your patient.        Sincerely,        Maddi Mccartney PA-C

## 2022-11-17 ENCOUNTER — OFFICE VISIT (OUTPATIENT)
Dept: ALLERGY | Facility: CLINIC | Age: 37
End: 2022-11-17
Attending: FAMILY MEDICINE
Payer: COMMERCIAL

## 2022-11-17 VITALS
OXYGEN SATURATION: 99 % | HEART RATE: 83 BPM | DIASTOLIC BLOOD PRESSURE: 78 MMHG | SYSTOLIC BLOOD PRESSURE: 113 MMHG | WEIGHT: 180.2 LBS | BODY MASS INDEX: 29.99 KG/M2

## 2022-11-17 DIAGNOSIS — T78.1XXA POLLEN-FOOD ALLERGY, INITIAL ENCOUNTER: Primary | ICD-10-CM

## 2022-11-17 DIAGNOSIS — Z88.9 MULTIPLE ALLERGIES: ICD-10-CM

## 2022-11-17 DIAGNOSIS — T36.0X5A PENICILLIN ADVERSE REACTION, INITIAL ENCOUNTER: ICD-10-CM

## 2022-11-17 DIAGNOSIS — J30.1 SEASONAL ALLERGIC RHINITIS DUE TO POLLEN: ICD-10-CM

## 2022-11-17 LAB
PATH REPORT.COMMENTS IMP SPEC: NORMAL
PATH REPORT.COMMENTS IMP SPEC: NORMAL
PATH REPORT.FINAL DX SPEC: NORMAL
PATH REPORT.GROSS SPEC: NORMAL
PATH REPORT.MICROSCOPIC SPEC OTHER STN: NORMAL
PATH REPORT.RELEVANT HX SPEC: NORMAL

## 2022-11-17 PROCEDURE — 99204 OFFICE O/P NEW MOD 45 MIN: CPT | Performed by: INTERNAL MEDICINE

## 2022-11-17 ASSESSMENT — ENCOUNTER SYMPTOMS
CHEST TIGHTNESS: 0
SINUS PRESSURE: 0
COUGH: 0
VOMITING: 0
FACIAL SWELLING: 0
EYE REDNESS: 0
DIARRHEA: 0
JOINT SWELLING: 0
FEVER: 0
ACTIVITY CHANGE: 0
RHINORRHEA: 0
NAUSEA: 0
CHILLS: 0
SHORTNESS OF BREATH: 0
ADENOPATHY: 0
WHEEZING: 0
HEADACHES: 0
MYALGIAS: 0
EYE DISCHARGE: 0
EYE ITCHING: 0
ARTHRALGIAS: 0

## 2022-11-17 NOTE — LETTER
11/17/2022         RE: Anne-Marie Hooks  [address] [facility] [address]        Dear Colleague,    Thank you for referring your patient, Anne-Marie Hooks, to the [facility] [place]. Please see a copy of my visit note below.    Anne-Marie Hooks was seen in the Allergy Clinic at [facility].    Anne-Marie Hooks is a 37 year old female being seen today for evaluation of symptoms related to fruit and vegetable ingestion as well as some nuts.  Also has known allergic rhinitis to birch and grass and weeds.  The other concern is an antibiotic allergy to amoxicillin and cephalosporins.    With the amoxicillin, this happened during childhood and she had lip swelling.  With a cephalosporin they described to her how she may have a reaction and then developed what she felt like was mouth swelling.  When evaluated in the emergency department no mouth swelling was identified.  She is wondering if this actually was not a reaction.  She did not have any hives or shortness of breath or vomiting.  She would like to have this allergy removed from her list.    She cannot tolerate bananas, citrus as well as berries.    For the environmental allergies, she takes Allegra and Flonase with good results.  She is not interested in allergy shots.      Past Medical History:   Diagnosis Date     Costochondritis 4/13/2022     Migraine      Other migraine without status migrainosus, not intractable 12/4/2015     Family History   Problem Relation Age of Onset     Hyperlipidemia Mother      Thyroid Disease Mother         hyper     Skin Cancer Mother      Hyperlipidemia Father      Allergies Father         seasonal allergies     Thyroid Disease Sister         hypo     Diabetes Maternal Grandmother      Hypertension Maternal Grandmother      Hyperlipidemia Maternal Grandmother      Obesity Maternal Grandmother      Coronary Artery Disease Maternal Grandfather      Skin Cancer Maternal Grandfather       Breast Cancer Paternal Grandmother      Cerebrovascular Disease Paternal Grandfather      Skin Cancer Maternal Aunt      Past Surgical History:   Procedure Laterality Date     BIOPSY  2006?    mole removed     EYE SURGERY         ENVIRONMENTAL HISTORY:   Pets inside the house include 2 dog(s).  Do you smoke cigarettes or other recreational drugs? No There is/are 1 smokers living in the house. The house does not have a damp basement.     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      Review of Systems   Constitutional: Negative for activity change, chills and fever.   HENT: Negative for congestion, dental problem, ear pain, facial swelling, nosebleeds, postnasal drip, rhinorrhea, sinus pressure and sneezing.    Eyes: Negative for discharge, redness and itching.   Respiratory: Negative for cough, chest tightness, shortness of breath and wheezing.    Cardiovascular: Negative for chest pain.   Gastrointestinal: Negative for diarrhea, nausea and vomiting.   Musculoskeletal: Negative for arthralgias, joint swelling and myalgias.   Skin: Negative for rash.   Neurological: Negative for headaches.   Hematological: Negative for adenopathy.   Psychiatric/Behavioral: Negative for behavioral problems and self-injury.   All other systems reviewed and are negative.        Current Outpatient Medications:      famotidine (PEPCID) 10 MG tablet, Take 10 mg by mouth 2 times daily as needed, Disp: , Rfl:      Fexofenadine HCl (ALLEGRA PO), Take 180 mg by mouth, Disp: , Rfl:      fluticasone (FLONASE) 50 MCG/ACT nasal spray, , Disp: , Rfl:      levonorgestrel (MIRENA) 20 MCG/DAY IUD, 1 each by Intrauterine route once, Disp: , Rfl:      VITAMIN D PO, , Disp: , Rfl:   Allergies   Allergen Reactions     Bactrim [Sulfamethoxazole W/Trimethoprim] Rash     Cephalosporins      Penicillins          EXAM:   /78   Pulse 83   Wt 81.7 kg (180 lb 3.2 oz)   SpO2 99%   BMI 29.99 kg/m      Physical  Exam    Constitutional:       General: She is not in acute distress.     Appearance: Normal appearance. She is not ill-appearing.   HENT:      Head: Normocephalic and atraumatic.      Nose: Nose normal. No congestion or rhinorrhea.      Mouth/Throat:      Mouth: Mucous membranes are moist.      Pharynx: Oropharynx is clear. No posterior oropharyngeal erythema.   Eyes:      General:         Right eye: No discharge.         Left eye: No discharge.   Cardiovascular:      Rate and Rhythm: Normal rate and regular rhythm.      Heart sounds: Normal heart sounds.   Pulmonary:      Effort: Pulmonary effort is normal.      Breath sounds: Normal breath sounds. No wheezing or rhonchi.   Skin:     General: Skin is warm.      Findings: No erythema or rash.   Neurological:      General: No focal deficit present.      Mental Status: She is alert. Mental status is at baseline.   Psychiatric:         Mood and Affect: Mood normal.         Behavior: Behavior normal.       ASSESSMENT/PLAN:  Anne-Marie Hooks is a 37 year old female seen today for evaluation for drug allergy, oral allergy syndrome, and allergic rhinitis.      A handout was provided in regards to oral allergy syndrome.  Her symptoms of mouth itching with numerous fruits and vegetables are all consistent with oral allergy syndrome.    For her environmental allergies the Allegra and Flonase provides good benefit.  She takes this all year-round.  Not interested in allergy shots at this time.    For the amoxicillin allergy, recommend skin testing and an oral challenge at a future appointment.  If this is negative would feel comfortable removing the cephalosporin allergy at the same time.    Follow-up in 2 to 4 weeks for skin testing to amoxicillin and the oral challenge      Thank you for allowing me to participate in the care of Anne-Marie Hooks.      I spent 35 minutes on the date of the encounter doing chart review, history and exam, documentation and further coordination  as noted above exclusive of separately reported interpretations    Doroteo Charles MD  Allergy/Immunology  [facility]        Again, thank you for allowing me to participate in the care of your patient.        Sincerely,        Doroteo Charles MD

## 2022-11-17 NOTE — PROGRESS NOTES
Anne-Marie Hooks was seen in the Allergy Clinic at [facility].    Anne-Marie Hooks is a 37 year old female being seen today for evaluation of symptoms related to fruit and vegetable ingestion as well as some nuts.  Also has known allergic rhinitis to birch and grass and weeds.  The other concern is an antibiotic allergy to amoxicillin and cephalosporins.    With the amoxicillin, this happened during childhood and she had lip swelling.  With a cephalosporin they described to her how she may have a reaction and then developed what she felt like was mouth swelling.  When evaluated in the emergency department no mouth swelling was identified.  She is wondering if this actually was not a reaction.  She did not have any hives or shortness of breath or vomiting.  She would like to have this allergy removed from her list.    She cannot tolerate bananas, citrus as well as berries.    For the environmental allergies, she takes Allegra and Flonase with good results.  She is not interested in allergy shots.      Past Medical History:   Diagnosis Date     Costochondritis 4/13/2022     Migraine      Other migraine without status migrainosus, not intractable 12/4/2015     Family History   Problem Relation Age of Onset     Hyperlipidemia Mother      Thyroid Disease Mother         hyper     Skin Cancer Mother      Hyperlipidemia Father      Allergies Father         seasonal allergies     Thyroid Disease Sister         hypo     Diabetes Maternal Grandmother      Hypertension Maternal Grandmother      Hyperlipidemia Maternal Grandmother      Obesity Maternal Grandmother      Coronary Artery Disease Maternal Grandfather      Skin Cancer Maternal Grandfather      Breast Cancer Paternal Grandmother      Cerebrovascular Disease Paternal Grandfather      Skin Cancer Maternal Aunt      Past Surgical History:   Procedure Laterality Date     BIOPSY  2006?    mole removed     EYE SURGERY         ENVIRONMENTAL HISTORY:    Pets inside the house include 2 dog(s).  Do you smoke cigarettes or other recreational drugs? No There is/are 1 smokers living in the house. The house does not have a damp basement.     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      Review of Systems   Constitutional: Negative for activity change, chills and fever.   HENT: Negative for congestion, dental problem, ear pain, facial swelling, nosebleeds, postnasal drip, rhinorrhea, sinus pressure and sneezing.    Eyes: Negative for discharge, redness and itching.   Respiratory: Negative for cough, chest tightness, shortness of breath and wheezing.    Cardiovascular: Negative for chest pain.   Gastrointestinal: Negative for diarrhea, nausea and vomiting.   Musculoskeletal: Negative for arthralgias, joint swelling and myalgias.   Skin: Negative for rash.   Neurological: Negative for headaches.   Hematological: Negative for adenopathy.   Psychiatric/Behavioral: Negative for behavioral problems and self-injury.   All other systems reviewed and are negative.        Current Outpatient Medications:      famotidine (PEPCID) 10 MG tablet, Take 10 mg by mouth 2 times daily as needed, Disp: , Rfl:      Fexofenadine HCl (ALLEGRA PO), Take 180 mg by mouth, Disp: , Rfl:      fluticasone (FLONASE) 50 MCG/ACT nasal spray, , Disp: , Rfl:      levonorgestrel (MIRENA) 20 MCG/DAY IUD, 1 each by Intrauterine route once, Disp: , Rfl:      VITAMIN D PO, , Disp: , Rfl:   Allergies   Allergen Reactions     Bactrim [Sulfamethoxazole W/Trimethoprim] Rash     Cephalosporins      Penicillins          EXAM:   /78   Pulse 83   Wt 81.7 kg (180 lb 3.2 oz)   SpO2 99%   BMI 29.99 kg/m      Physical Exam    Constitutional:       General: She is not in acute distress.     Appearance: Normal appearance. She is not ill-appearing.   HENT:      Head: Normocephalic and atraumatic.      Nose: Nose normal. No congestion or rhinorrhea.      Mouth/Throat:      Mouth:  Mucous membranes are moist.      Pharynx: Oropharynx is clear. No posterior oropharyngeal erythema.   Eyes:      General:         Right eye: No discharge.         Left eye: No discharge.   Cardiovascular:      Rate and Rhythm: Normal rate and regular rhythm.      Heart sounds: Normal heart sounds.   Pulmonary:      Effort: Pulmonary effort is normal.      Breath sounds: Normal breath sounds. No wheezing or rhonchi.   Skin:     General: Skin is warm.      Findings: No erythema or rash.   Neurological:      General: No focal deficit present.      Mental Status: She is alert. Mental status is at baseline.   Psychiatric:         Mood and Affect: Mood normal.         Behavior: Behavior normal.       ASSESSMENT/PLAN:  Anne-Marie Hooks is a 37 year old female seen today for evaluation for drug allergy, oral allergy syndrome, and allergic rhinitis.      A handout was provided in regards to oral allergy syndrome.  Her symptoms of mouth itching with numerous fruits and vegetables are all consistent with oral allergy syndrome.    For her environmental allergies the Allegra and Flonase provides good benefit.  She takes this all year-round.  Not interested in allergy shots at this time.    For the amoxicillin allergy, recommend skin testing and an oral challenge at a future appointment.  If this is negative would feel comfortable removing the cephalosporin allergy at the same time.    Follow-up in 2 to 4 weeks for skin testing to amoxicillin and the oral challenge      Thank you for allowing me to participate in the care of Anne-Marie Hooks.      I spent 35 minutes on the date of the encounter doing chart review, history and exam, documentation and further coordination as noted above exclusive of separately reported interpretations    Doroteo Charles MD  Allergy/Immunology  [facility]

## 2022-12-16 ENCOUNTER — TELEPHONE (OUTPATIENT)
Dept: DERMATOLOGY | Facility: CLINIC | Age: 37
End: 2022-12-16

## 2022-12-16 ENCOUNTER — OFFICE VISIT (OUTPATIENT)
Dept: DERMATOLOGY | Facility: CLINIC | Age: 37
End: 2022-12-16
Payer: COMMERCIAL

## 2022-12-16 ENCOUNTER — OFFICE VISIT (OUTPATIENT)
Dept: ALLERGY | Facility: CLINIC | Age: 37
End: 2022-12-16
Payer: COMMERCIAL

## 2022-12-16 VITALS
BODY MASS INDEX: 29.97 KG/M2 | OXYGEN SATURATION: 99 % | DIASTOLIC BLOOD PRESSURE: 72 MMHG | SYSTOLIC BLOOD PRESSURE: 105 MMHG | WEIGHT: 180.12 LBS | HEART RATE: 96 BPM

## 2022-12-16 DIAGNOSIS — T78.1XXD POLLEN-FOOD ALLERGY, SUBSEQUENT ENCOUNTER: ICD-10-CM

## 2022-12-16 DIAGNOSIS — T36.0X5D: Primary | ICD-10-CM

## 2022-12-16 DIAGNOSIS — D48.5 NEOPLASM OF UNCERTAIN BEHAVIOR OF SKIN: ICD-10-CM

## 2022-12-16 DIAGNOSIS — J30.1 SEASONAL ALLERGIC RHINITIS DUE TO POLLEN: ICD-10-CM

## 2022-12-16 PROCEDURE — 95018 ALL TSTG PERQ&IQ DRUGS/BIOL: CPT | Performed by: INTERNAL MEDICINE

## 2022-12-16 PROCEDURE — 88312 SPECIAL STAINS GROUP 1: CPT | Performed by: PATHOLOGY

## 2022-12-16 PROCEDURE — 11102 TANGNTL BX SKIN SINGLE LES: CPT | Performed by: PHYSICIAN ASSISTANT

## 2022-12-16 PROCEDURE — 95076 INGEST CHALLENGE INI 120 MIN: CPT | Performed by: INTERNAL MEDICINE

## 2022-12-16 PROCEDURE — 99207 PR DROP WITH A PROCEDURE: CPT | Performed by: PHYSICIAN ASSISTANT

## 2022-12-16 PROCEDURE — 99213 OFFICE O/P EST LOW 20 MIN: CPT | Mod: 25 | Performed by: INTERNAL MEDICINE

## 2022-12-16 PROCEDURE — 88305 TISSUE EXAM BY PATHOLOGIST: CPT | Performed by: PATHOLOGY

## 2022-12-16 NOTE — PROGRESS NOTES
After discussing the risks and benefits of the procedure, including the risks of the oral medication challenge, written and verbal consent was obtained and the procedure was initiated.     Skin Prick/Intradermal Testing    Product Lot #/Exp. Date Time Placed Skin Prick  W (mm) F (mm) Time Placed Intradermal #1  W (mm) F (mm) Intradermal #2  W (mm) F (mm)  Results  +/-       Time Read      Pre-Pen  0.02 ml of 6X10-5 molar [id number]  12/23 7:30 0 0 7:44 2 3 2 3 Negative        8:00 1 2 1 2    Penicillin G  10,000 units/ ml [id number]  08/23 7:30 0 0 7:44 2 3 2 10 Negative  Reviewed by Dr. Charles        7:44 2 3 2 10    Diluent/Control [id number]  03/23/2024 7:30 0 0 7:44 2 3 * Negative        8:00 2 3     Histamine [id number]  01/18/2025   7:30 4 6  Positive         Oral Challenge    Amoxicillin  500mg chewable tablet Lot #/Exp. Dose Time of Ingestion Time of Vitals BP Pulse       pOx Signs/Symptoms Result  +/-     Dose (250mg) [id number]  04/25   0.5 tablet   8:15   8:35 122/84   68   98   None   Negative       Dose (500mg) [id number]  04/25    1.5 tablet   8:45 9:15 105/72 65 99 None Negative

## 2022-12-16 NOTE — PATIENT INSTRUCTIONS
Allergy Staff Appt Hours Shot Hours Location         Physician   Doroteo Charles MD      Support Staff   MUKESH Mason, RN   Omar COOK, KHADAR VARGAS LPN          Mondays  Not available until January/2023 Wednesdays  Close    Tuesdays Thursdays and Fridays:  [place] 7-5                    [place]     Tuesdays: 7:40-3:20      Fridays: 7:40-4:20                [facility]  [address]  [place] [address]  Appt Line: [phone]    Pulmonary Function Scheduling:  [place]: [phone]         Questions about cost of your care?  For questions about your cost of your visit, procedure, lab or imaging contact: [employer] Price Line [phone] or visit: [email or web address]    Important Scheduling Information  Appointments for skin testing: Appointment will last approximately 45 minutes.  Please call the appointment line for your clinic to schedule.  Discontinue oral antihistamines 7 days prior to the appointment.  Discontinue nasal and ocular antihistamines 4 days prior to appointment.    Appointments for challenges (oral food challenge, penicillin testing, aspirin desensitization) If your provider instructs you to that this additional testing is indicated, it will need to be scheduled directly through the allergy department.  Please contact them via [employer] or by calling the clinic and asking to speak with the allergy team.  They will provide additional information and instructions for the appointment.  Discontinue oral antihistamines 7 days prior to the appointment.  Discontinue nasal and ocular antihistamines 4 days prior to the appointment.    Thank you for trusting us with your care. Please feel free to contact us with any questions or concerns you may have.

## 2022-12-16 NOTE — LETTER
12/16/2022         RE: Anne-Marie Hooks  [address] [facility] [address]        Dear Colleague,    Thank you for referring your patient, Anne-Marie Hooks, to the [facility]. Please see a copy of my visit note below.    HPI:   Chief complaints: Anne-Marie Hooks is a pleasant 37 year old female who presents for evaluation of a recurrent rash. For the past 2-cooper years she will randomly get red welts which last for a few days. Initially, they start out asymptomatic and then will become itchy the 2nd day. There is usually a blister like area in the center which resolves after a few days. She has the central area today and was told to get it checked.       PHYSICAL EXAM:    There were no vitals taken for this visit.  Skin exam performed as follows: Type 2 skin. Mood appropriate  Alert and Oriented X 3. Well developed, well nourished in no distress.  General appearance: Normal  Head including face: Normal  Eyes: conjunctiva and lids: Normal  Mouth: Lips, teeth, gums: Normal  Neck: Normal  Cardiovascular: Exam of peripheral vascular system by observation for swelling, varicosities, edema: Normal  Right upper extremity: Normal  Left upper extremity: Normal  Right lower extremity: Normal  Left lower extremity: Normal  Skin: Scalp and body hair: See below    Pink umbilicated papule on the left forearm with faint surrounding erythema    ASSESSMENT/PLAN:     1. Molluscum vs arthropod reaction vs urticarial dermatitis vs other on the left forearm. Shave biopsy performed.  Area cleaned and anesthetized with 1% lidocaine with epinephrine.  Dermablade used to remove the lesion and sent to pathology. Bleeding was cauterized. Pt tolerated procedure well with no complications.             Follow-up: pending path  CC:   Scribed By: Maddi Mccartney, MS, ADRIANO          Again, thank you for allowing me to participate in the care of your patient.        Sincerely,        Maddi Mccartney PA-C

## 2022-12-16 NOTE — TELEPHONE ENCOUNTER
[facility] Call Center    Phone Message    May a detailed message be left on voicemail: yes     Reason for Call: Symptoms or Concerns     If patient has red-flag symptoms, warm transfer to triage line    Current symptom or concern: rash / bug bite    Symptoms have been present for:  5 day(s)    Has patient previously been seen for this? No    By : N/A    Date: N/A  Are there any new or worsening symptoms? No      Action Taken: Other: ox derm    Travel Screening: Not Applicable

## 2022-12-16 NOTE — PROGRESS NOTES
Anne-Marie Hooks was seen in the Allergy Clinic at [facility].    Anne-Marie Hooks is a 37 year old female being seen today for ongoing evaluation of drug challenge    Since the last visit the patient has been feeling the same.    She has known oral allergy syndrome as well as allergic rhinitis.  At last visit is not interested in allergy shots but does have a few questions in regards to allergy shots today.  Typically uses Allegra and Flonase in the spring.    Symptoms to penicillin and cephalosporin were similar.  Amoxicillin was lip swelling and the cephalosporin was a reaction where she thought her mouth was swelling but when evaluated in the emergency department the swelling was not identified.  She would like to remove both allergies from her list if possible.    Past Medical History:   Diagnosis Date     Costochondritis 4/13/2022     Migraine      Other migraine without status migrainosus, not intractable 12/4/2015     Family History   Problem Relation Age of Onset     Hyperlipidemia Mother      Thyroid Disease Mother         hyper     Skin Cancer Mother      Hyperlipidemia Father      Allergies Father         seasonal allergies     Thyroid Disease Sister         hypo     Diabetes Maternal Grandmother      Hypertension Maternal Grandmother      Hyperlipidemia Maternal Grandmother      Obesity Maternal Grandmother      Coronary Artery Disease Maternal Grandfather      Skin Cancer Maternal Grandfather      Breast Cancer Paternal Grandmother      Cerebrovascular Disease Paternal Grandfather      Skin Cancer Maternal Aunt      Past Surgical History:   Procedure Laterality Date     BIOPSY  2006?    mole removed     EYE SURGERY           Current Outpatient Medications:      famotidine (PEPCID) 10 MG tablet, Take 10 mg by mouth 2 times daily as needed, Disp: , Rfl:      fluticasone (FLONASE) 50 MCG/ACT nasal spray, , Disp: , Rfl:      levonorgestrel (MIRENA) 20 MCG/DAY IUD, 1 each by  Intrauterine route once, Disp: , Rfl:      VITAMIN D PO, , Disp: , Rfl:      Fexofenadine HCl (ALLEGRA PO), Take 180 mg by mouth, Disp: , Rfl:   Allergies   Allergen Reactions     Bactrim [Sulfamethoxazole W/Trimethoprim] Rash         EXAM:   /72   Pulse 96   Wt 81.7 kg (180 lb 1.9 oz)   SpO2 99%   BMI 29.97 kg/m      Constitutional:       General: She is not in acute distress.     Appearance: Normal appearance. She is not ill-appearing.   HENT:      Head: Normocephalic and atraumatic.      Mouth/Throat:      Mouth: Mucous membranes are moist.      Pharynx: Oropharynx is clear. No posterior oropharyngeal erythema.   Eyes:      General:         Right eye: No discharge.         Left eye: No discharge.   Pulmonary:      Effort: Pulmonary effort is normal.     Skin:     General: Skin is warm.      Findings: No erythema or rash.  After skin testing she did have a macular erythematous rash on her lower neck.  This disappeared at 1 point and then reappeared at the very end of the oral challenge.  This is not a hive.  Neurological:      General: No focal deficit present.      Mental Status: She is alert. Mental status is at baseline.   Psychiatric:         Mood and Affect: Mood normal.         Behavior: Behavior normal.     WORKUP: Skin testing and Drug Challenge.  Skin testing was negative to the penicillin and Pre-Pen on both prick and intradermal testing.  She did have erythema around the 1 set of intradermal test for penicillin as well as the negative control.  This was not present on the duplicate component and there was not a significant wheal present.    At that point proceeded with the oral challenge.  The oral challenge to place in divided doses.  She was observed for 75 minutes.  No reaction was seen.  No longer considered allergic.    ASSESSMENT/PLAN:  Anne-Marie Hooks is a 37 year old female seen today for penicillin testing.  Negative skin testing with the negative oral challenge.  No longer  considered allergic.  Removed both penicillin and the cephalosporin allergy from her list.    In addition she has allergic rhinitis.  She has some interest in allergy shots.  This was described in detail.  At this point she will contact the clinic if interested in moving forward.  We would have to check skin tests at that time.    Follow-up if interested in allergy shots in the future      Thank you for allowing me to participate in the care of Anne-Marie Hooks.      I spent 30 minutes on the date of the encounter doing chart review, history and exam, documentation and further coordination as noted above exclusive of separately reported interpretations    Doroteo Charles MD  Allergy/Immunology  [facility]

## 2022-12-16 NOTE — PATIENT INSTRUCTIONS
Wound Care Instructions     FOR SUPERFICIAL WOUNDS     [facility]  [phone]                 AFTER 24 HOURS YOU SHOULD REMOVE THE BANDAGE AND BEGIN DAILY DRESSING CHANGES AS FOLLOWS:     1) Remove Dressing.     2) Clean and dry the area with tap water using a Q-tip or sterile gauze pad.     3) Apply Vaseline, Aquaphor, Polysporin ointment or Bacitracin ointment over entire wound.  Do NOT use Neosporin ointment.     4) Cover the wound with a band-aid, or a sterile non-stick gauze pad and micropore paper tape    REPEAT THESE INSTRUCTIONS AT LEAST ONCE A DAY UNTIL THE WOUND HAS COMPLETELY HEALED.    It is an old wives tale that a wound heals better when it is exposed to air and allowed to dry out. The wound will heal faster with a better cosmetic result if it is kept moist with ointment and covered with a bandage.    **Do not let the wound dry out.**    Supplies Needed:      *Cotton tipped applicators (Q-tips)    *Vaseline, Aquaphor, Polysporin or Bacitracin Ointment (NOT NEOSPORIN)    *Band-aids or non-stick gauze pads and micropore paper tape.      PATIENT INFORMATION:    During the healing process you will notice a number of changes. All wounds develop a small halo of redness surrounding the wound.  This means healing is occurring. Severe itching with extensive redness usually indicates sensitivity to the ointment or bandage tape used to dress the wound.  You should call our office if this develops.      Swelling  and/or discoloration around your surgical site is common, particularly when performed around the eye.    All wounds normally drain.  The larger the wound the more drainage there will be.  After 7-10 days, you will notice the wound beginning to shrink and new skin will begin to grow.  The wound is healed when you can see skin has formed over the entire area.  A healed wound has a healthy, shiny look to the surface and is red to dark pink in color to normalize.  Wounds may  take approximately 4-6 weeks to heal.  Larger wounds may take 6-8 weeks.  After the wound is healed you may discontinue dressing changes.    You may experience a sensation of tightness as your wound heals. This is normal and will gradually subside.    Your healed wound may be sensitive to temperature changes. This sensitivity improves with time, but if you re having a lot of discomfort, try to avoid temperature extremes.    Patients frequently experience itching after their wound appears to have healed because of the continue healing under the skin.  Plain Vaseline will help relieve the itching.      POSSIBLE COMPLICATIONS    BLEEDING:    Leave the bandage in place.  Use tightly rolled up gauze or a cloth to apply direct pressure over the bandage for 30  minutes.  Reapply pressure for an additional 30 minutes if necessary  Use additional gauze and tape to maintain pressure once the bleeding has stopped.

## 2022-12-16 NOTE — PROGRESS NOTES
HPI:   Chief complaints: Anne-Marie Hooks is a pleasant 37 year old female who presents for evaluation of a recurrent rash. For the past 2-cooper years she will randomly get red welts which last for a few days. Initially, they start out asymptomatic and then will become itchy the 2nd day. There is usually a blister like area in the center which resolves after a few days. She has the central area today and was told to get it checked.       PHYSICAL EXAM:    There were no vitals taken for this visit.  Skin exam performed as follows: Type 2 skin. Mood appropriate  Alert and Oriented X 3. Well developed, well nourished in no distress.  General appearance: Normal  Head including face: Normal  Eyes: conjunctiva and lids: Normal  Mouth: Lips, teeth, gums: Normal  Neck: Normal  Cardiovascular: Exam of peripheral vascular system by observation for swelling, varicosities, edema: Normal  Right upper extremity: Normal  Left upper extremity: Normal  Right lower extremity: Normal  Left lower extremity: Normal  Skin: Scalp and body hair: See below    Pink umbilicated papule on the left forearm with faint surrounding erythema    ASSESSMENT/PLAN:     1. Molluscum vs arthropod reaction vs urticarial dermatitis vs other on the left forearm. Shave biopsy performed.  Area cleaned and anesthetized with 1% lidocaine with epinephrine.  Dermablade used to remove the lesion and sent to pathology. Bleeding was cauterized. Pt tolerated procedure well with no complications.             Follow-up: pending path  CC:   Scribed By: Maddi Mccartney, MS, PAVERONIKA

## 2022-12-16 NOTE — LETTER
12/16/2022         RE: Anne-Marie Hooks  [address] [facility] [address]        Dear Colleague,    Thank you for referring your patient, Anne-Marie Hooks, to the [facility] [place]. Please see a copy of my visit note below.    Anne-Marie Hooks was seen in the Allergy Clinic at [facility].    Anne-Marie Hooks is a 37 year old female being seen today for ongoing evaluation of drug challenge    Since the last visit the patient has been feeling the same.    She has known oral allergy syndrome as well as allergic rhinitis.  At last visit is not interested in allergy shots but does have a few questions in regards to allergy shots today.  Typically uses Allegra and Flonase in the spring.    Symptoms to penicillin and cephalosporin were similar.  Amoxicillin was lip swelling and the cephalosporin was a reaction where she thought her mouth was swelling but when evaluated in the emergency department the swelling was not identified.  She would like to remove both allergies from her list if possible.    Past Medical History:   Diagnosis Date     Costochondritis 4/13/2022     Migraine      Other migraine without status migrainosus, not intractable 12/4/2015     Family History   Problem Relation Age of Onset     Hyperlipidemia Mother      Thyroid Disease Mother         hyper     Skin Cancer Mother      Hyperlipidemia Father      Allergies Father         seasonal allergies     Thyroid Disease Sister         hypo     Diabetes Maternal Grandmother      Hypertension Maternal Grandmother      Hyperlipidemia Maternal Grandmother      Obesity Maternal Grandmother      Coronary Artery Disease Maternal Grandfather      Skin Cancer Maternal Grandfather      Breast Cancer Paternal Grandmother      Cerebrovascular Disease Paternal Grandfather      Skin Cancer Maternal Aunt      Past Surgical History:   Procedure Laterality Date     BIOPSY  2006?    mole removed     EYE SURGERY            Current Outpatient Medications:      famotidine (PEPCID) 10 MG tablet, Take 10 mg by mouth 2 times daily as needed, Disp: , Rfl:      fluticasone (FLONASE) 50 MCG/ACT nasal spray, , Disp: , Rfl:      levonorgestrel (MIRENA) 20 MCG/DAY IUD, 1 each by Intrauterine route once, Disp: , Rfl:      VITAMIN D PO, , Disp: , Rfl:      Fexofenadine HCl (ALLEGRA PO), Take 180 mg by mouth, Disp: , Rfl:   Allergies   Allergen Reactions     Bactrim [Sulfamethoxazole W/Trimethoprim] Rash         EXAM:   /72   Pulse 96   Wt 81.7 kg (180 lb 1.9 oz)   SpO2 99%   BMI 29.97 kg/m      Constitutional:       General: She is not in acute distress.     Appearance: Normal appearance. She is not ill-appearing.   HENT:      Head: Normocephalic and atraumatic.      Mouth/Throat:      Mouth: Mucous membranes are moist.      Pharynx: Oropharynx is clear. No posterior oropharyngeal erythema.   Eyes:      General:         Right eye: No discharge.         Left eye: No discharge.   Pulmonary:      Effort: Pulmonary effort is normal.     Skin:     General: Skin is warm.      Findings: No erythema or rash.  After skin testing she did have a macular erythematous rash on her lower neck.  This disappeared at 1 point and then reappeared at the very end of the oral challenge.  This is not a hive.  Neurological:      General: No focal deficit present.      Mental Status: She is alert. Mental status is at baseline.   Psychiatric:         Mood and Affect: Mood normal.         Behavior: Behavior normal.     WORKUP: Skin testing and Drug Challenge.  Skin testing was negative to the penicillin and Pre-Pen on both prick and intradermal testing.  She did have erythema around the 1 set of intradermal test for penicillin as well as the negative control.  This was not present on the duplicate component and there was not a significant wheal present.    At that point proceeded with the oral challenge.  The oral challenge to place in divided doses.  She  was observed for 75 minutes.  No reaction was seen.  No longer considered allergic.    ASSESSMENT/PLAN:  Anne-Marie Hooks is a 37 year old female seen today for penicillin testing.  Negative skin testing with the negative oral challenge.  No longer considered allergic.  Removed both penicillin and the cephalosporin allergy from her list.    In addition she has allergic rhinitis.  She has some interest in allergy shots.  This was described in detail.  At this point she will contact the clinic if interested in moving forward.  We would have to check skin tests at that time.    Follow-up if interested in allergy shots in the future      Thank you for allowing me to participate in the care of Anne-Marie Hooks.      I spent 30 minutes on the date of the encounter doing chart review, history and exam, documentation and further coordination as noted above exclusive of separately reported interpretations    Doroteo Charles MD  Allergy/Immunology  [facility]    After discussing the risks and benefits of the procedure, including the risks of the oral medication challenge, written and verbal consent was obtained and the procedure was initiated.     Skin Prick/Intradermal Testing    Product Lot #/Exp. Date Time Placed Skin Prick  W (mm) F (mm) Time Placed Intradermal #1  W (mm) F (mm) Intradermal #2  W (mm) F (mm)  Results  +/-       Time Read      Pre-Pen  0.02 ml of 6X10-5 molar [id number]  12/23 7:30 0 0 7:44 2 3 2 3 Negative        8:00 1 2 1 2    Penicillin G  10,000 units/ ml [id number]  08/23 7:30 0 0 7:44 2 3 2 10 Negative  Reviewed by Dr. Charles        7:44 2 3 2 10    Diluent/Control [id number]  03/23/2024 7:30 0 0 7:44 2 3 * Negative        8:00 2 3     Histamine [id number]  01/18/2025   7:30 4 6  Positive         Oral Challenge    Amoxicillin  500mg chewable tablet Lot #/Exp. Dose Time of Ingestion Time of Vitals BP Pulse       pOx Signs/Symptoms Result  +/-     Dose (250mg) [id number]  04/25   0.5 tablet    8:15   8:35 122/84   68   98   None   Negative       Dose (500mg) [id number]  04/25    1.5 tablet   8:45 9:15 105/72 65 99 None Negative                   Again, thank you for allowing me to participate in the care of your patient.        Sincerely,        Doroteo Charles MD

## 2022-12-16 NOTE — TELEPHONE ENCOUNTER
Called and spoke with patient, asked her to send pictures on [id number] so I can show Maddi. Pt states she will do that now.    Thank you,  Sheila ENG RN  Dermatology   [phone]

## 2023-01-03 LAB
PATH REPORT.ADDENDUM SPEC: NORMAL
PATH REPORT.COMMENTS IMP SPEC: NORMAL
PATH REPORT.COMMENTS IMP SPEC: NORMAL
PATH REPORT.FINAL DX SPEC: NORMAL
PATH REPORT.GROSS SPEC: NORMAL
PATH REPORT.MICROSCOPIC SPEC OTHER STN: NORMAL
PATH REPORT.RELEVANT HX SPEC: NORMAL

## 2023-06-01 ENCOUNTER — HEALTH MAINTENANCE LETTER (OUTPATIENT)
Age: 38
End: 2023-06-01

## 2024-04-23 NOTE — Clinical Note
Please abstract the following data from this visit with this patient into the appropriate field in Epic:Pap smear done on this date: 10/11/17 (approximately), by this group: [facility], results were Negative, Pap due every three years.  Received pt in intake. Pt is A&Ox4 with past medical history of autism & schizoaffective disorder. Pt came to the ED due to having right lower abdominal pain for 1 day. Pt states it just started out of no where but the pain has subsided and she states she is ok with that pain level of a 3 out of 10. Pt abdomen is soft and nondistended with no pain upon palpitation. Pt breathing is equal and nonlabored. Pt denies any chest pain, SOB, headache, nausea, vomiting, diarrhea, fever or chills. pt has a 20g to the right AC. pt safety maintained.

## 2024-12-29 ENCOUNTER — HEALTH MAINTENANCE LETTER (OUTPATIENT)
Age: 39
End: 2024-12-29